# Patient Record
Sex: MALE | Race: WHITE | NOT HISPANIC OR LATINO | Employment: OTHER | URBAN - METROPOLITAN AREA
[De-identification: names, ages, dates, MRNs, and addresses within clinical notes are randomized per-mention and may not be internally consistent; named-entity substitution may affect disease eponyms.]

---

## 2018-01-02 ENCOUNTER — APPOINTMENT (OUTPATIENT)
Dept: RADIOLOGY | Facility: MEDICAL CENTER | Age: 48
End: 2018-01-02
Attending: EMERGENCY MEDICINE
Payer: MEDICARE

## 2018-01-02 ENCOUNTER — HOSPITAL ENCOUNTER (EMERGENCY)
Facility: MEDICAL CENTER | Age: 48
End: 2018-01-02
Attending: EMERGENCY MEDICINE
Payer: MEDICARE

## 2018-01-02 VITALS
HEART RATE: 75 BPM | OXYGEN SATURATION: 95 % | WEIGHT: 194 LBS | DIASTOLIC BLOOD PRESSURE: 71 MMHG | SYSTOLIC BLOOD PRESSURE: 123 MMHG | TEMPERATURE: 97.9 F | RESPIRATION RATE: 16 BRPM | BODY MASS INDEX: 27.16 KG/M2 | HEIGHT: 71 IN

## 2018-01-02 DIAGNOSIS — J06.9 UPPER RESPIRATORY TRACT INFECTION, UNSPECIFIED TYPE: ICD-10-CM

## 2018-01-02 DIAGNOSIS — R05.9 COUGH: ICD-10-CM

## 2018-01-02 PROCEDURE — 71046 X-RAY EXAM CHEST 2 VIEWS: CPT

## 2018-01-02 PROCEDURE — 99284 EMERGENCY DEPT VISIT MOD MDM: CPT | Mod: 25

## 2018-01-02 RX ORDER — BENZONATATE 100 MG/1
100 CAPSULE ORAL 3 TIMES DAILY PRN
Qty: 20 CAP | Refills: 0 | Status: SHIPPED | OUTPATIENT
Start: 2018-01-02

## 2018-01-02 ASSESSMENT — PAIN SCALES - WONG BAKER: WONGBAKER_NUMERICALRESPONSE: DOESN'T HURT AT ALL

## 2018-01-02 NOTE — ED NOTES
Pt c/o cough x 3 weeks, states was dx'd w/ bronchitis and given scripts for, was feeling better then became sicker new year's kerry.

## 2018-01-02 NOTE — ED PROVIDER NOTES
"ED Provider Note    Scribed for Murtaza Ochoa M.D. by Ema Lafleur. 1/2/2018, 8:50 AM.    Primary care provider: No primary care provider on file.  Means of arrival: Walk-in  History obtained from: Patient  History limited by: None    CHIEF COMPLAINT  Chief Complaint   Patient presents with   • Cough     HPI  Sanjiv Cali is a 47 y.o. male who presents to the Emergency Department for cough onset three weeks ago. Patient was evaluated at [facility] four days ago and was given Azithromycin at that time. The patient reportedly finished his antibiotics but \"lost a few\" of the doses. Other symptoms include fever, chills, diarrhea and generalized body aches onset three days ago. Patient denies history of asthma. He also denies being a cigarette smoker.     Patient states he has an appointment with an ENT in [place] in two days to get \"a camera and listening device\" removed form his ear.     REVIEW OF SYSTEMS  Pertinent positives include cough, fever, chills, diarrhea, generalized body aches.   Pertinent negatives include no vomiting.    All other systems reviewed and negative.  C.     PAST MEDICAL HISTORY   No chronic diagnoses reported during this encounter.     SURGICAL HISTORY  patient denies any surgical history    SOCIAL HISTORY  Patient is from [place].     FAMILY HISTORY  None reported during this encounter.     CURRENT MEDICATIONS  No daily medications reported.     ALLERGIES  No Known Allergies    PHYSICAL EXAM  VITAL SIGNS: /73   Pulse 78   Temp 36.6 °C (97.9 °F) (Temporal)   Resp 14   Ht 1.803 m (5' 11\")   Wt 88 kg (194 lb)   SpO2 95%   BMI 27.06 kg/m²     Nursing note and vitals reviewed.  Constitutional: No distress.   HENT: Head is atraumatic. Oropharynx is moist.   Eyes: Conjunctivae are normal. Pupils are equal, round, and reactive to light.   Cardiovascular: Normal peripheral perfusion,Regular rate and rhythm, no audible murmur  Respiratory: No respiratory distress. No rales, " or wheezing. Normal work of breathing.  Musculoskeletal: Normal range of motion.   Neurological: Alert. No focal deficits noted.    Skin: No rash.   Psych: Appropriate for clinical situation     DIAGNOSTIC STUDIES / PROCEDURES    LABS  None.     RADIOLOGY  DX-CHEST-2 VIEWS   Final Result      No consolidation.        The radiologist's interpretation of all radiological studies have been reviewed by me.    COURSE & MEDICAL DECISION MAKING  Nursing notes, VS, PMSFHx reviewed in chart.    Review of past medical records shows the patient has no past visits.      8:50 AM - Patient seen and examined at bedside. I informed the patient I would like to evaluate him for the flu secondary to his symptomology. I also would like to order a chest x-ray to rule out pneumonia. The patient was agreeable. Ordered chest x-ray to evaluate his symptoms. Differential diagnoses include but not limited to: Pneumonia, influenza    10:35 AM Reviewed patient's radiology results, which are shown above.     10:38 AM Patient updated on radiology results.     DISPOSITION:  Patient will be discharged home in stable condition.    FOLLOW UP:  [facility], Emergency Dept  [address] [place] [id number] [phone]    If symptoms worsen      OUTPATIENT MEDICATIONS:  Discharge Medication List as of 1/2/2018 10:12 AM      START taking these medications    Details   benzonatate (TESSALON) 100 MG Cap Take 1 Cap by mouth 3 times a day as needed for Cough., Disp-20 Cap, R-0, Print Rx Paper             The patient was discharged home with an information sheet on upper respiratory tract infection and told to return immediately for any signs or symptoms listed.  The patient agreed to the discharge precautions and follow-up plan which is documented in EPIC.    FINAL IMPRESSION  1. Cough    2. Upper respiratory tract infection, unspecified type          I, Ema Lafleur (Scribe), am scribing for, and in the presence of, Murtaza AMADOR  MAXINE Ochoa.    Electronically signed by: Ema Lafleur (Scribe), 1/2/2018    IMurtaza M.D. personally performed the services described in this documentation, as scribed by Ema Lafleur in my presence, and it is both accurate and complete.    The note accurately reflects work and decisions made by me.  Murtaza Ochoa  1/2/2018  3:22 PM

## 2018-01-02 NOTE — DISCHARGE INSTRUCTIONS
Cough, Adult   A cough is a reflex that helps clear your throat and airways. It can help heal the body or may be a reaction to an irritated airway. A cough may only last 2 or 3 weeks (acute) or may last more than 8 weeks (chronic).   CAUSES  Acute cough:  · Viral or bacterial infections.  Chronic cough:  · Infections.  · Allergies.  · Asthma.  · Post-nasal drip.  · Smoking.  · Heartburn or acid reflux.  · Some medicines.  · Chronic lung problems (COPD).  · Cancer.  SYMPTOMS   · Cough.  · Fever.  · Chest pain.  · Increased breathing rate.  · High-pitched whistling sound when breathing (wheezing).  · Colored mucus that you cough up (sputum).  TREATMENT   · A bacterial cough may be treated with antibiotic medicine.  · A viral cough must run its course and will not respond to antibiotics.  · Your caregiver may recommend other treatments if you have a chronic cough.  HOME CARE INSTRUCTIONS   · Only take over-the-counter or prescription medicines for pain, discomfort, or fever as directed by your caregiver. Use cough suppressants only as directed by your caregiver.  · Use a cold steam vaporizer or humidifier in your bedroom or home to help loosen secretions.  · Sleep in a semi-upright position if your cough is worse at night.  · Rest as needed.  · Stop smoking if you smoke.  SEEK IMMEDIATE MEDICAL CARE IF:   · You have pus in your sputum.  · Your cough starts to worsen.  · You cannot control your cough with suppressants and are losing sleep.  · You begin coughing up blood.  · You have difficulty breathing.  · You develop pain which is getting worse or is uncontrolled with medicine.  · You have a fever.  MAKE SURE YOU:   · Understand these instructions.  · Will watch your condition.  · Will get help right away if you are not doing well or get worse.     This information is not intended to replace advice given to you by your health care provider. Make sure you discuss any questions you have with your health care provider.      Document Released: 06/15/2012 Document Revised: 03/11/2013 Document Reviewed: 02/24/2016  [employer] Interactive Patient Education ©2016 [employer]

## 2018-03-07 ENCOUNTER — HOSPITAL ENCOUNTER (EMERGENCY)
Facility: MEDICAL CENTER | Age: 48
End: 2018-03-07
Attending: EMERGENCY MEDICINE
Payer: MEDICARE

## 2018-03-07 VITALS
RESPIRATION RATE: 16 BRPM | HEIGHT: 71 IN | OXYGEN SATURATION: 99 % | TEMPERATURE: 98.6 F | HEART RATE: 80 BPM | BODY MASS INDEX: 26.17 KG/M2 | DIASTOLIC BLOOD PRESSURE: 60 MMHG | SYSTOLIC BLOOD PRESSURE: 110 MMHG | WEIGHT: 186.95 LBS

## 2018-03-07 DIAGNOSIS — T78.40XA ALLERGIC REACTION, INITIAL ENCOUNTER: ICD-10-CM

## 2018-03-07 PROCEDURE — 99283 EMERGENCY DEPT VISIT LOW MDM: CPT

## 2018-03-07 RX ORDER — HYDROCORTISONE 25 MG/ML
LOTION TOPICAL
Qty: 1 BOTTLE | Refills: 0 | Status: SHIPPED | OUTPATIENT
Start: 2018-03-07

## 2018-03-07 RX ORDER — PREDNISONE 20 MG/1
40 TABLET ORAL DAILY
Qty: 12 TAB | Refills: 0 | Status: SHIPPED | OUTPATIENT
Start: 2018-03-07 | End: 2018-03-13

## 2018-03-07 NOTE — ED PROVIDER NOTES
"ED Provider Note    CHIEF COMPLAINT  Chief Complaint   Patient presents with   • Rash     to upper body, started last night, pt is not on any medications, only allergy is bee stings, took 50mg benadryl this morning which helped       HPI  Sanjiv Cali is a 47 y.o. male who presents for evaluation of rash.  The patient presents with a one-day history of rash to his entire body including his extremities and torso.  He describes being a pruritic rash.  He denies any new soaps lotions creams medications or food exposures.  There's been no recent: Fever, URI symptoms, cardiorespiratory symptoms, gastrointestinal symptoms.  No other complaints.    REVIEW OF SYSTEMS  See HPI for further details.  No health problems such as: Hypertension, diabetes, current pulmonary disorders, gastrointestinal disorder;    PAST MEDICAL HISTORY  History reviewed. No pertinent past medical history.    FAMILY HISTORY  History reviewed. No pertinent family history.    SOCIAL HISTORY  Positive tobacco use; previous alcohol use;    SURGICAL HISTORY  History reviewed. No pertinent surgical history.    CURRENT MEDICATIONS  Home Medications     Reviewed by Mitali Scott R.N. (Registered Nurse) on 03/07/18 at 1017  Med List Status: Complete   Medication Last Dose Status   benzonatate (TESSALON) 100 MG Cap  Active                ALLERGIES  No Known Allergies    PHYSICAL EXAM  VITAL SIGNS: /69   Pulse 88   Temp 37 °C (98.6 °F) (Temporal)   Resp 16   Ht 1.803 m (5' 11\")   Wt 84.8 kg (186 lb 15.2 oz)   SpO2 99%   BMI 26.07 kg/m²    Constitutional: A 47-year-old male, Well developed, Well nourished, No acute distress, Non-toxic appearance.   HENT: Normocephalic, Atraumatic, Nares:Clear, Oropharynx: moist, well hydrated, posterior pharynx:clear   Eyes: PERRL, EOMI, Conjunctiva normal, No discharge.   Neck: Normal range of motion, No tenderness, Supple, No stridor.   Lymphatic: No lymphadenopathy noted.   Cardiovascular: Regular rate and " rhythm without mumurs, gallups, rubs   Thorax & Lungs: Normal Equal breath sounds, No respiratory distress, No wheezing, no stridor, no rales. No chest tenderness.   Abdomen: Soft, nontender, nondistended, no organomegaly, positive bowel sounds normal in quality. No guarding or rebound.  Patient has diffuse erythematous slightly raised Rash over the extremities and torso; no involvement in the palms or soles of the feet;  Skin: Good skin turgor, pink, warm, dry. No petechiae, purpura. Normal capillary refill.   Extremities: Intact distal pulses, No edema, No tenderness, No cyanosis,  Vascular: Pulses are 2+, symmetric in the upper and lower extremities.  Musculoskeletal: Good range of motion in all major joints. No tenderness to palpation or major deformities noted.   Neurologic: Alert & oriented x 3,  No gross focal deficits noted.   Psychiatric: Affect normal, Judgment normal, Mood normal.      COURSE & MEDICAL DECISION MAKING  Pertinent Labs & Imaging studies reviewed. (See chart for details)  Discussion: At this time, the patient presents with a rash that appears to be allergic in etiology.  The exact precipitating cause is unclear, which is quite common.  There is no evidence any intraoral involvement or respiratory compromise.  Vital signs are normal.  Patient looks well clinically.  I see no evidence of findings in regards emergent intervention or workup.  I discussed the findings and treatment plan with patient.  He indicates he is comfortable with this explanation and disposition.    FINAL IMPRESSION  1. Allergic reaction, initial encounter        PLAN  1.  Appropriate discharge instructions given  2.  Hytone lotion 2.5%  3.  Prednisone 20 mg  4.  Follow-up with primary care    Electronically signed by: Guy G Gansert, 3/7/2018 10:48 AM

## 2018-03-07 NOTE — ED TRIAGE NOTES
Ambulates to triage  Chief Complaint   Patient presents with   • Rash     to upper body, started last night, pt is not on any medications, only allergy is bee stings, took 50mg benadryl this morning which helped     VSS.

## 2018-04-24 ENCOUNTER — OFFICE VISIT (OUTPATIENT)
Dept: URGENT CARE | Facility: PHYSICIAN GROUP | Age: 48
End: 2018-04-24
Payer: MEDICARE

## 2018-04-24 VITALS
BODY MASS INDEX: 26.18 KG/M2 | HEIGHT: 71 IN | DIASTOLIC BLOOD PRESSURE: 78 MMHG | HEART RATE: 62 BPM | WEIGHT: 187 LBS | OXYGEN SATURATION: 97 % | TEMPERATURE: 97.3 F | SYSTOLIC BLOOD PRESSURE: 122 MMHG

## 2018-04-24 DIAGNOSIS — R51.9 NONINTRACTABLE HEADACHE, UNSPECIFIED CHRONICITY PATTERN, UNSPECIFIED HEADACHE TYPE: ICD-10-CM

## 2018-04-24 PROCEDURE — 99203 OFFICE O/P NEW LOW 30 MIN: CPT | Performed by: PHYSICIAN ASSISTANT

## 2018-04-24 RX ORDER — ASPIRIN 81 MG/1
81 TABLET, CHEWABLE ORAL
COMMUNITY
Start: 2018-01-24

## 2018-04-24 RX ORDER — NITROGLYCERIN 0.4 MG/1
0.4 TABLET SUBLINGUAL
COMMUNITY
Start: 2018-01-24

## 2018-04-27 ASSESSMENT — ENCOUNTER SYMPTOMS
SORE THROAT: 0
SENSORY CHANGE: 0
VOMITING: 0
WHEEZING: 0
DIZZINESS: 0
FEVER: 0
NECK PAIN: 0
MYALGIAS: 0
EYE DISCHARGE: 0
PHOTOPHOBIA: 1
MIGRAINE HEADACHES: 1
EYE WATERING: 0
DOUBLE VISION: 0
COUGH: 0
EYE REDNESS: 0
WEAKNESS: 0
VISUAL CHANGE: 0
SEIZURES: 0
ABDOMINAL PAIN: 0
TINGLING: 0
SPEECH CHANGE: 0
EYE PAIN: 0
SCALP TENDERNESS: 0
LOSS OF CONSCIOUSNESS: 0
CHILLS: 0
ABNORMAL BEHAVIOR: 0
NAUSEA: 1
HEADACHES: 1
FOCAL WEAKNESS: 0
BLURRED VISION: 1

## 2018-04-27 NOTE — PROGRESS NOTES
Subjective:      Sanjiv Cali is a 47 y.o. male who presents with Headache (x2wks)            Patient is a 47-year-old male who presents today with intermittent headache for the last 2 weeks. Patient reports that the headache was worse this afternoon however since has resolved. Patient is taking aspirin with good relief. Patient reports on history of intermittent migraines for the last several years. Patient was followed by a neurologist when he was living in [place] and is asking for referral at this time. Patient denies being on prophylactic or rescue migraine medication in the past few years, his headaches have been pretty mild and are rare. When he gets the migraine he reports photophobia with slight aura of blurry vision. He denies such at this time.      Headache    This is a recurrent problem. Episode onset: 2 weeks ago. The problem occurs intermittently. The problem has been resolved. The pain is located in the frontal region. The pain quality is similar to prior headaches. The quality of the pain is described as aching. The pain is at a severity of 0/10 (Headache has resolved at this time. ). Associated symptoms include blurred vision, nausea and photophobia. Pertinent negatives include no abdominal pain, abnormal behavior, coughing, dizziness, ear pain, eye pain, eye redness, eye watering, fever, hearing loss, muscle aches, neck pain, phonophobia, scalp tenderness, seizures, sore throat, tingling, visual change, vomiting or weakness. The symptoms are aggravated by bright light. He has tried cold packs (aspirin) for the symptoms. The treatment provided significant relief. His past medical history is significant for migraine headaches.       Review of Systems   Constitutional: Negative for chills, fever and malaise/fatigue.   HENT: Negative for congestion, ear discharge, ear pain, hearing loss and sore throat.    Eyes: Positive for blurred vision and photophobia. Negative for double vision, pain,  "discharge and redness.   Respiratory: Negative for cough and wheezing.    Gastrointestinal: Positive for nausea. Negative for abdominal pain and vomiting.   Genitourinary: Negative for dysuria and urgency.   Musculoskeletal: Negative for myalgias and neck pain.   Skin: Negative for itching and rash.   Neurological: Positive for headaches. Negative for dizziness, tingling, sensory change, speech change, focal weakness, seizures, loss of consciousness and weakness.   All other systems reviewed and are negative.         Objective:     /78   Pulse 62   Temp 36.3 °C (97.3 °F)   Ht 1.803 m (5' 11\")   Wt 84.8 kg (187 lb)   SpO2 97%   BMI 26.08 kg/m²    PMH:  has no past medical history on file.  MEDS:   Current Outpatient Prescriptions:   •  aspirin (ASA) 81 MG Chew Tab chewable tablet, Take 81 mg by mouth., Disp: , Rfl:   •  nitroglycerin (NITROSTAT) 0.4 MG SL Tab, Place 0.4 mg under tongue., Disp: , Rfl:   •  hydrocortisone 2.5 % lotion, Apply to affected area twice a day, Disp: 1 Bottle, Rfl: 0  •  benzonatate (TESSALON) 100 MG Cap, Take 1 Cap by mouth 3 times a day as needed for Cough., Disp: 20 Cap, Rfl: 0  ALLERGIES:   Allergies   Allergen Reactions   • Bee Anaphylaxis     SURGHX: No past surgical history on file.  SOCHX:  reports that he has been smoking Cigars.  His smokeless tobacco use includes Chew. He reports that he does not drink alcohol or use drugs.  FH: Family history was reviewed, no pertinent findings to report    Physical Exam   Constitutional: He is oriented to person, place, and time. He appears well-developed and well-nourished.   HENT:   Head: Normocephalic and atraumatic.   Right Ear: External ear normal.   Left Ear: External ear normal.   Nose: Nose normal.   Mouth/Throat: Oropharynx is clear and moist. No oropharyngeal exudate.   Eyes: EOM are normal. Pupils are equal, round, and reactive to light.   Neck: Normal range of motion. Neck supple.   Cardiovascular: Normal rate and regular " rhythm.    No murmur heard.  Pulmonary/Chest: Effort normal and breath sounds normal. No respiratory distress.   Musculoskeletal: Normal range of motion. He exhibits no edema.   Lymphadenopathy:     He has no cervical adenopathy.   Neurological: He is alert and oriented to person, place, and time. He displays normal reflexes. No cranial nerve deficit. He exhibits normal muscle tone. Coordination normal.   Neg. Finger to nose, neg. Pronator drift, neg. Rhomberg. TANI's appropriate. Gait steady.    Skin: Skin is warm. No rash noted.   Psychiatric: He has a normal mood and affect. His behavior is normal.   Vitals reviewed.              Assessment/Plan:     1. Nonintractable headache, unspecified chronicity pattern, unspecified headache type    Referral to neurology was made today. Patient declines any medication today as patient is having good relief with aspirin and ice pack. I strongly encouraged the patient to make establish care appointment with a new primary care.  Patient given precautionary s/sx that mandate immediate follow up and evaluation in the ED. Advised of risks of not doing so.    DDX, Supportive care, and indications for immediate follow-up discussed with patient.    Instructed to return to clinic or nearest emergency department if we are not available for any change in condition, further concerns, or worsening of symptoms.    The patient demonstrated a good understanding and agreed with the treatment plan.

## 2018-05-07 ENCOUNTER — HOSPITAL ENCOUNTER (EMERGENCY)
Dept: HOSPITAL 8 - ED | Age: 48
Discharge: LEFT BEFORE DISCHARGE | End: 2018-05-07
Payer: SELF-PAY

## 2018-05-07 VITALS — BODY MASS INDEX: 25.86 KG/M2 | HEIGHT: 71 IN | WEIGHT: 184.75 LBS

## 2018-05-07 VITALS — SYSTOLIC BLOOD PRESSURE: 151 MMHG | DIASTOLIC BLOOD PRESSURE: 85 MMHG

## 2018-05-07 DIAGNOSIS — R51: ICD-10-CM

## 2018-05-07 DIAGNOSIS — R05: Primary | ICD-10-CM

## 2018-05-07 DIAGNOSIS — Z53.21: ICD-10-CM

## 2019-06-08 NOTE — ED NOTES
DC instructions and 1 written prescription given to pt; verbalized understanding. DC'd amb.   08-Jun-2019 23:48

## 2020-07-19 ENCOUNTER — APPOINTMENT (OUTPATIENT)
Dept: RADIOLOGY | Facility: MEDICAL CENTER | Age: 50
DRG: 551 | End: 2020-07-19
Payer: OTHER MISCELLANEOUS

## 2020-07-19 ENCOUNTER — APPOINTMENT (OUTPATIENT)
Dept: RADIOLOGY | Facility: MEDICAL CENTER | Age: 50
DRG: 551 | End: 2020-07-19
Attending: EMERGENCY MEDICINE
Payer: OTHER MISCELLANEOUS

## 2020-07-19 ENCOUNTER — HOSPITAL ENCOUNTER (INPATIENT)
Facility: MEDICAL CENTER | Age: 50
LOS: 6 days | DRG: 551 | End: 2020-07-25
Attending: EMERGENCY MEDICINE | Admitting: SURGERY
Payer: OTHER MISCELLANEOUS

## 2020-07-19 DIAGNOSIS — S06.360A TRAUMATIC HEMORRHAGE OF CEREBRUM WITHOUT LOSS OF CONSCIOUSNESS, UNSPECIFIED LATERALITY, INITIAL ENCOUNTER (HCC): ICD-10-CM

## 2020-07-19 DIAGNOSIS — S32.030A CLOSED COMPRESSION FRACTURE OF L3 LUMBAR VERTEBRA, INITIAL ENCOUNTER (HCC): ICD-10-CM
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
## 2020-07-19 DIAGNOSIS — S32.039A CLOSED FRACTURE OF THIRD LUMBAR VERTEBRA, UNSPECIFIED FRACTURE MORPHOLOGY, INITIAL ENCOUNTER (HCC): ICD-10-CM

## 2020-07-19 PROBLEM — S06.331A: Status: ACTIVE | Noted: 2020-07-19

## 2020-07-19 PROBLEM — Z53.09 CONTRAINDICATION TO ANTICOAGULATION THERAPY: Status: ACTIVE | Noted: 2020-07-19

## 2020-07-19 PROBLEM — T14.90XA TRAUMA: Status: ACTIVE | Noted: 2020-07-19

## 2020-07-19 PROBLEM — F10.920 ACUTE ALCOHOL INTOXICATION, UNCOMPLICATED (HCC): Status: ACTIVE | Noted: 2020-07-19

## 2020-07-19 PROBLEM — Z11.9 SCREENING EXAMINATION FOR INFECTIOUS DISEASE: Status: ACTIVE | Noted: 2020-07-19

## 2020-07-19 PROBLEM — S32.020A COMPRESSION FRACTURE OF L2 LUMBAR VERTEBRA, CLOSED, INITIAL ENCOUNTER (HCC): Status: ACTIVE | Noted: 2020-07-19

## 2020-07-19 LAB
ABO GROUP BLD: NORMAL
ALBUMIN SERPL BCP-MCNC: 4.2 G/DL (ref 3.2–4.9)
ALBUMIN/GLOB SERPL: 1.6 G/DL
ALP SERPL-CCNC: 49 U/L (ref 30–99)
ALT SERPL-CCNC: 22 U/L (ref 2–50)
ANION GAP SERPL CALC-SCNC: 18 MMOL/L (ref 7–16)
APTT PPP: 21.8 SEC (ref 24.7–36)
AST SERPL-CCNC: 51 U/L (ref 12–45)
BASOPHILS # BLD AUTO: 0.3 % (ref 0–1.8)
BASOPHILS # BLD: 0.05 K/UL (ref 0–0.12)
BILIRUB SERPL-MCNC: 0.3 MG/DL (ref 0.1–1.5)
BLD GP AB SCN SERPL QL: NORMAL
BUN SERPL-MCNC: 11 MG/DL (ref 8–22)
CALCIUM SERPL-MCNC: 9 MG/DL (ref 8.5–10.5)
CFT BLD TEG: 4 MIN (ref 5–10)
CHLORIDE SERPL-SCNC: 105 MMOL/L (ref 96–112)
CLOT ANGLE BLD TEG: 52 DEGREES (ref 53–72)
CLOT LYSIS 30M P MA LENFR BLD TEG: 0 % (ref 0–8)
CO2 SERPL-SCNC: 22 MMOL/L (ref 20–33)
COVID ORDER STATUS COVID19: NORMAL
CREAT SERPL-MCNC: 0.78 MG/DL (ref 0.5–1.4)
CT.EXTRINSIC BLD ROTEM: 3.3 MIN (ref 1–3)
EOSINOPHIL # BLD AUTO: 0.02 K/UL (ref 0–0.51)
EOSINOPHIL NFR BLD: 0.1 % (ref 0–6.9)
ERYTHROCYTE [DISTWIDTH] IN BLOOD BY AUTOMATED COUNT: 46.7 FL (ref 35.9–50)
ETHANOL BLD-MCNC: 125.2 MG/DL (ref 0–10.1)
GLOBULIN SER CALC-MCNC: 2.6 G/DL (ref 1.9–3.5)
GLUCOSE SERPL-MCNC: 113 MG/DL (ref 65–99)
HCT VFR BLD AUTO: 51.1 % (ref 42–52)
HGB BLD-MCNC: 17.5 G/DL (ref 14–18)
IMM GRANULOCYTES # BLD AUTO: 0.09 K/UL (ref 0–0.11)
IMM GRANULOCYTES NFR BLD AUTO: 0.6 % (ref 0–0.9)
INR PPP: 0.91 (ref 0.87–1.13)
LYMPHOCYTES # BLD AUTO: 1.33 K/UL (ref 1–4.8)
LYMPHOCYTES NFR BLD: 9.2 % (ref 22–41)
MCF BLD TEG: 54.1 MM (ref 50–70)
MCH RBC QN AUTO: 31.8 PG (ref 27–33)
MCHC RBC AUTO-ENTMCNC: 34.2 G/DL (ref 33.7–35.3)
MCV RBC AUTO: 92.7 FL (ref 81.4–97.8)
MONOCYTES # BLD AUTO: 0.9 K/UL (ref 0–0.85)
MONOCYTES NFR BLD AUTO: 6.2 % (ref 0–13.4)
NEUTROPHILS # BLD AUTO: 12.03 K/UL (ref 1.82–7.42)
NEUTROPHILS NFR BLD: 83.6 % (ref 44–72)
NRBC # BLD AUTO: 0 K/UL
NRBC BLD-RTO: 0 /100 WBC
PA AA BLD-ACNC: 48.5 %
PA ADP BLD-ACNC: 79.7 %
PLATELET # BLD AUTO: 161 K/UL (ref 164–446)
PMV BLD AUTO: 10.6 FL (ref 9–12.9)
POTASSIUM SERPL-SCNC: 3.9 MMOL/L (ref 3.6–5.5)
PROT SERPL-MCNC: 6.8 G/DL (ref 6–8.2)
PROTHROMBIN TIME: 12.5 SEC (ref 12–14.6)
RBC # BLD AUTO: 5.51 M/UL (ref 4.7–6.1)
RH BLD: NORMAL
SARS-COV-2 RNA RESP QL NAA+PROBE: NOTDETECTED
SODIUM SERPL-SCNC: 145 MMOL/L (ref 135–145)
SPECIMEN SOURCE: NORMAL
TEG ALGORITHM TGALG: ABNORMAL
WBC # BLD AUTO: 14.4 K/UL (ref 4.8–10.8)

## 2020-07-19 PROCEDURE — 85347 COAGULATION TIME ACTIVATED: CPT

## 2020-07-19 PROCEDURE — U0003 INFECTIOUS AGENT DETECTION BY NUCLEIC ACID (DNA OR RNA); SEVERE ACUTE RESPIRATORY SYNDROME CORONAVIRUS 2 (SARS-COV-2) (CORONAVIRUS DISEASE [COVID-19]), AMPLIFIED PROBE TECHNIQUE, MAKING USE OF HIGH THROUGHPUT TECHNOLOGIES AS DESCRIBED BY CMS-2020-01-R: HCPCS

## 2020-07-19 PROCEDURE — 85576 BLOOD PLATELET AGGREGATION: CPT

## 2020-07-19 PROCEDURE — 80053 COMPREHEN METABOLIC PANEL: CPT

## 2020-07-19 PROCEDURE — 36415 COLL VENOUS BLD VENIPUNCTURE: CPT

## 2020-07-19 PROCEDURE — HZ2ZZZZ DETOXIFICATION SERVICES FOR SUBSTANCE ABUSE TREATMENT: ICD-10-PCS | Performed by: SURGERY

## 2020-07-19 PROCEDURE — 700117 HCHG RX CONTRAST REV CODE 255: Performed by: EMERGENCY MEDICINE

## 2020-07-19 PROCEDURE — 99285 EMERGENCY DEPT VISIT HI MDM: CPT

## 2020-07-19 PROCEDURE — A9270 NON-COVERED ITEM OR SERVICE: HCPCS | Performed by: EMERGENCY MEDICINE

## 2020-07-19 PROCEDURE — 86850 RBC ANTIBODY SCREEN: CPT

## 2020-07-19 PROCEDURE — 700102 HCHG RX REV CODE 250 W/ 637 OVERRIDE(OP): Performed by: EMERGENCY MEDICINE

## 2020-07-19 PROCEDURE — 85384 FIBRINOGEN ACTIVITY: CPT

## 2020-07-19 PROCEDURE — 86901 BLOOD TYPING SEROLOGIC RH(D): CPT

## 2020-07-19 PROCEDURE — 86900 BLOOD TYPING SEROLOGIC ABO: CPT

## 2020-07-19 PROCEDURE — L0464 TLSO 4MOD SACRO-SCAP PRE: HCPCS

## 2020-07-19 PROCEDURE — 71045 X-RAY EXAM CHEST 1 VIEW: CPT

## 2020-07-19 PROCEDURE — 72125 CT NECK SPINE W/O DYE: CPT

## 2020-07-19 PROCEDURE — C9803 HOPD COVID-19 SPEC COLLECT: HCPCS | Performed by: SURGERY

## 2020-07-19 PROCEDURE — 85610 PROTHROMBIN TIME: CPT

## 2020-07-19 PROCEDURE — 305948 HCHG GREEN TRAUMA ACT PRE-NOTIFY NO CC

## 2020-07-19 PROCEDURE — L0458 TLSO 2MOD SYMPHIS-XIPHO PRE: HCPCS

## 2020-07-19 PROCEDURE — 85025 COMPLETE CBC W/AUTO DIFF WBC: CPT

## 2020-07-19 PROCEDURE — 80307 DRUG TEST PRSMV CHEM ANLYZR: CPT

## 2020-07-19 PROCEDURE — 85730 THROMBOPLASTIN TIME PARTIAL: CPT

## 2020-07-19 PROCEDURE — 74177 CT ABD & PELVIS W/CONTRAST: CPT

## 2020-07-19 PROCEDURE — 72131 CT LUMBAR SPINE W/O DYE: CPT

## 2020-07-19 PROCEDURE — 770006 HCHG ROOM/CARE - MED/SURG/GYN SEMI*

## 2020-07-19 PROCEDURE — 70450 CT HEAD/BRAIN W/O DYE: CPT

## 2020-07-19 RX ORDER — AMOXICILLIN 250 MG
1 CAPSULE ORAL
Status: DISCONTINUED | OUTPATIENT
Start: 2020-07-19 | End: 2020-07-25 | Stop reason: HOSPADM

## 2020-07-19 RX ORDER — LORAZEPAM 1 MG/1
1 TABLET ORAL EVERY 4 HOURS PRN
Status: DISCONTINUED | OUTPATIENT
Start: 2020-07-19 | End: 2020-07-21

## 2020-07-19 RX ORDER — LORAZEPAM 2 MG/ML
2 INJECTION INTRAMUSCULAR
Status: DISCONTINUED | OUTPATIENT
Start: 2020-07-19 | End: 2020-07-22

## 2020-07-19 RX ORDER — LEVETIRACETAM 500 MG/1
500 TABLET ORAL 2 TIMES DAILY
Status: DISCONTINUED | OUTPATIENT
Start: 2020-07-20 | End: 2020-07-25 | Stop reason: HOSPADM

## 2020-07-19 RX ORDER — LORAZEPAM 2 MG/ML
1 INJECTION INTRAMUSCULAR
Status: DISCONTINUED | OUTPATIENT
Start: 2020-07-19 | End: 2020-07-21

## 2020-07-19 RX ORDER — LORAZEPAM 2 MG/ML
1.5 INJECTION INTRAMUSCULAR
Status: DISCONTINUED | OUTPATIENT
Start: 2020-07-19 | End: 2020-07-22

## 2020-07-19 RX ORDER — ENEMA 19; 7 G/133ML; G/133ML
1 ENEMA RECTAL
Status: COMPLETED | OUTPATIENT
Start: 2020-07-19 | End: 2020-07-22

## 2020-07-19 RX ORDER — LEVETIRACETAM 500 MG/1
500 TABLET ORAL ONCE
Status: COMPLETED | OUTPATIENT
Start: 2020-07-19 | End: 2020-07-19

## 2020-07-19 RX ORDER — BISACODYL 10 MG
10 SUPPOSITORY, RECTAL RECTAL
Status: DISCONTINUED | OUTPATIENT
Start: 2020-07-19 | End: 2020-07-25 | Stop reason: HOSPADM

## 2020-07-19 RX ORDER — BACITRACIN ZINC AND POLYMYXIN B SULFATE 500; 1000 [USP'U]/G; [USP'U]/G
OINTMENT TOPICAL 3 TIMES DAILY
Status: DISCONTINUED | OUTPATIENT
Start: 2020-07-20 | End: 2020-07-25 | Stop reason: HOSPADM

## 2020-07-19 RX ORDER — LORAZEPAM 1 MG/1
3 TABLET ORAL
Status: DISCONTINUED | OUTPATIENT
Start: 2020-07-19 | End: 2020-07-22

## 2020-07-19 RX ORDER — ONDANSETRON 2 MG/ML
4 INJECTION INTRAMUSCULAR; INTRAVENOUS EVERY 4 HOURS PRN
Status: DISCONTINUED | OUTPATIENT
Start: 2020-07-19 | End: 2020-07-25 | Stop reason: HOSPADM

## 2020-07-19 RX ORDER — POLYETHYLENE GLYCOL 3350 17 G/17G
1 POWDER, FOR SOLUTION ORAL 2 TIMES DAILY
Status: DISCONTINUED | OUTPATIENT
Start: 2020-07-19 | End: 2020-07-25 | Stop reason: HOSPADM

## 2020-07-19 RX ORDER — LORAZEPAM 1 MG/1
4 TABLET ORAL
Status: DISCONTINUED | OUTPATIENT
Start: 2020-07-19 | End: 2020-07-22

## 2020-07-19 RX ORDER — DOCUSATE SODIUM 100 MG/1
100 CAPSULE, LIQUID FILLED ORAL 2 TIMES DAILY
Status: DISCONTINUED | OUTPATIENT
Start: 2020-07-19 | End: 2020-07-25 | Stop reason: HOSPADM

## 2020-07-19 RX ORDER — LORAZEPAM 2 MG/ML
0.5 INJECTION INTRAMUSCULAR EVERY 4 HOURS PRN
Status: DISCONTINUED | OUTPATIENT
Start: 2020-07-19 | End: 2020-07-21

## 2020-07-19 RX ORDER — AMOXICILLIN 250 MG
1 CAPSULE ORAL NIGHTLY
Status: DISCONTINUED | OUTPATIENT
Start: 2020-07-19 | End: 2020-07-25 | Stop reason: HOSPADM

## 2020-07-19 RX ORDER — LORAZEPAM 1 MG/1
2 TABLET ORAL
Status: DISCONTINUED | OUTPATIENT
Start: 2020-07-19 | End: 2020-07-21

## 2020-07-19 RX ORDER — FOLIC ACID 1 MG/1
1 TABLET ORAL DAILY
Status: COMPLETED | OUTPATIENT
Start: 2020-07-20 | End: 2020-07-23

## 2020-07-19 RX ORDER — OXYCODONE HYDROCHLORIDE 5 MG/1
5 TABLET ORAL
Status: DISCONTINUED | OUTPATIENT
Start: 2020-07-19 | End: 2020-07-23

## 2020-07-19 RX ORDER — ACETAMINOPHEN 500 MG
1000 TABLET ORAL EVERY 6 HOURS
Status: DISPENSED | OUTPATIENT
Start: 2020-07-20 | End: 2020-07-25

## 2020-07-19 RX ORDER — ACETAMINOPHEN 500 MG
1000 TABLET ORAL ONCE
Status: COMPLETED | OUTPATIENT
Start: 2020-07-19 | End: 2020-07-19

## 2020-07-19 RX ORDER — THIAMINE MONONITRATE (VIT B1) 100 MG
100 TABLET ORAL DAILY
Status: COMPLETED | OUTPATIENT
Start: 2020-07-20 | End: 2020-07-23

## 2020-07-19 RX ORDER — LORAZEPAM 1 MG/1
0.5 TABLET ORAL EVERY 4 HOURS PRN
Status: DISCONTINUED | OUTPATIENT
Start: 2020-07-19 | End: 2020-07-21

## 2020-07-19 RX ORDER — OXYCODONE HYDROCHLORIDE 10 MG/1
10 TABLET ORAL
Status: DISCONTINUED | OUTPATIENT
Start: 2020-07-19 | End: 2020-07-23

## 2020-07-19 RX ADMIN — ACETAMINOPHEN 1000 MG: 500 TABLET ORAL at 20:48

## 2020-07-19 RX ADMIN — IOHEXOL 100 ML: 350 INJECTION, SOLUTION INTRAVENOUS at 22:26

## 2020-07-19 RX ADMIN — LEVETIRACETAM 500 MG: 500 TABLET ORAL at 21:36

## 2020-07-19 ASSESSMENT — LIFESTYLE VARIABLES
DO YOU DRINK ALCOHOL: YES
EVER_SMOKED: YES

## 2020-07-19 ASSESSMENT — COPD QUESTIONNAIRES
COPD SCREENING SCORE: 3
DURING THE PAST 4 WEEKS HOW MUCH DID YOU FEEL SHORT OF BREATH: NONE/LITTLE OF THE TIME
HAVE YOU SMOKED AT LEAST 100 CIGARETTES IN YOUR ENTIRE LIFE: YES
DO YOU EVER COUGH UP ANY MUCUS OR PHLEGM?: NO/ONLY WITH OCCASIONAL COLDS OR INFECTIONS

## 2020-07-20 ENCOUNTER — APPOINTMENT (OUTPATIENT)
Dept: RADIOLOGY | Facility: MEDICAL CENTER | Age: 50
DRG: 551 | End: 2020-07-20
Attending: NURSE PRACTITIONER
Payer: OTHER MISCELLANEOUS

## 2020-07-20 PROBLEM — S70.311A ABRASION OF RIGHT THIGH: Status: ACTIVE | Noted: 2020-07-20

## 2020-07-20 LAB
ABO + RH BLD: NORMAL
ALBUMIN SERPL BCP-MCNC: 3.9 G/DL (ref 3.2–4.9)
ALBUMIN/GLOB SERPL: 1.7 G/DL
ALP SERPL-CCNC: 44 U/L (ref 30–99)
ALT SERPL-CCNC: 23 U/L (ref 2–50)
ANION GAP SERPL CALC-SCNC: 13 MMOL/L (ref 7–16)
AST SERPL-CCNC: 59 U/L (ref 12–45)
BASOPHILS # BLD AUTO: 0.4 % (ref 0–1.8)
BASOPHILS # BLD: 0.04 K/UL (ref 0–0.12)
BILIRUB SERPL-MCNC: 0.3 MG/DL (ref 0.1–1.5)
BUN SERPL-MCNC: 12 MG/DL (ref 8–22)
CALCIUM SERPL-MCNC: 8.5 MG/DL (ref 8.5–10.5)
CHLORIDE SERPL-SCNC: 102 MMOL/L (ref 96–112)
CO2 SERPL-SCNC: 25 MMOL/L (ref 20–33)
CREAT SERPL-MCNC: 0.8 MG/DL (ref 0.5–1.4)
EOSINOPHIL # BLD AUTO: 0.18 K/UL (ref 0–0.51)
EOSINOPHIL NFR BLD: 1.6 % (ref 0–6.9)
ERYTHROCYTE [DISTWIDTH] IN BLOOD BY AUTOMATED COUNT: 47.3 FL (ref 35.9–50)
GLOBULIN SER CALC-MCNC: 2.3 G/DL (ref 1.9–3.5)
GLUCOSE SERPL-MCNC: 130 MG/DL (ref 65–99)
HCT VFR BLD AUTO: 47.8 % (ref 42–52)
HGB BLD-MCNC: 16.3 G/DL (ref 14–18)
IMM GRANULOCYTES # BLD AUTO: 0.07 K/UL (ref 0–0.11)
IMM GRANULOCYTES NFR BLD AUTO: 0.6 % (ref 0–0.9)
LYMPHOCYTES # BLD AUTO: 1.35 K/UL (ref 1–4.8)
LYMPHOCYTES NFR BLD: 12.2 % (ref 22–41)
MAGNESIUM SERPL-MCNC: 2.1 MG/DL (ref 1.5–2.5)
MCH RBC QN AUTO: 31.7 PG (ref 27–33)
MCHC RBC AUTO-ENTMCNC: 34.1 G/DL (ref 33.7–35.3)
MCV RBC AUTO: 93 FL (ref 81.4–97.8)
MONOCYTES # BLD AUTO: 0.7 K/UL (ref 0–0.85)
MONOCYTES NFR BLD AUTO: 6.3 % (ref 0–13.4)
NEUTROPHILS # BLD AUTO: 8.77 K/UL (ref 1.82–7.42)
NEUTROPHILS NFR BLD: 78.9 % (ref 44–72)
NRBC # BLD AUTO: 0 K/UL
NRBC BLD-RTO: 0 /100 WBC
PHOSPHATE SERPL-MCNC: 4.2 MG/DL (ref 2.5–4.5)
PLATELET # BLD AUTO: 147 K/UL (ref 164–446)
PMV BLD AUTO: 10.3 FL (ref 9–12.9)
POTASSIUM SERPL-SCNC: 3.7 MMOL/L (ref 3.6–5.5)
PROT SERPL-MCNC: 6.2 G/DL (ref 6–8.2)
RBC # BLD AUTO: 5.14 M/UL (ref 4.7–6.1)
SODIUM SERPL-SCNC: 140 MMOL/L (ref 135–145)
WBC # BLD AUTO: 11.1 K/UL (ref 4.8–10.8)

## 2020-07-20 PROCEDURE — 700111 HCHG RX REV CODE 636 W/ 250 OVERRIDE (IP): Performed by: NURSE PRACTITIONER

## 2020-07-20 PROCEDURE — 97161 PT EVAL LOW COMPLEX 20 MIN: CPT

## 2020-07-20 PROCEDURE — A9270 NON-COVERED ITEM OR SERVICE: HCPCS | Performed by: SURGERY

## 2020-07-20 PROCEDURE — 700111 HCHG RX REV CODE 636 W/ 250 OVERRIDE (IP): Performed by: SURGERY

## 2020-07-20 PROCEDURE — 700112 HCHG RX REV CODE 229: Performed by: SURGERY

## 2020-07-20 PROCEDURE — 80053 COMPREHEN METABOLIC PANEL: CPT

## 2020-07-20 PROCEDURE — 36415 COLL VENOUS BLD VENIPUNCTURE: CPT

## 2020-07-20 PROCEDURE — 97165 OT EVAL LOW COMPLEX 30 MIN: CPT

## 2020-07-20 PROCEDURE — 85025 COMPLETE CBC W/AUTO DIFF WBC: CPT

## 2020-07-20 PROCEDURE — 700101 HCHG RX REV CODE 250: Performed by: SURGERY

## 2020-07-20 PROCEDURE — 84100 ASSAY OF PHOSPHORUS: CPT

## 2020-07-20 PROCEDURE — 83735 ASSAY OF MAGNESIUM: CPT

## 2020-07-20 PROCEDURE — 96374 THER/PROPH/DIAG INJ IV PUSH: CPT

## 2020-07-20 PROCEDURE — 700102 HCHG RX REV CODE 250 W/ 637 OVERRIDE(OP): Performed by: SURGERY

## 2020-07-20 PROCEDURE — 770006 HCHG ROOM/CARE - MED/SURG/GYN SEMI*

## 2020-07-20 PROCEDURE — 73110 X-RAY EXAM OF WRIST: CPT | Mod: LT

## 2020-07-20 RX ADMIN — ACETAMINOPHEN 1000 MG: 500 TABLET ORAL at 20:06

## 2020-07-20 RX ADMIN — FENTANYL CITRATE 50 MCG: 50 INJECTION INTRAMUSCULAR; INTRAVENOUS at 03:11

## 2020-07-20 RX ADMIN — FOLIC ACID 1 MG: 1 TABLET ORAL at 06:11

## 2020-07-20 RX ADMIN — Medication 1 EACH: at 12:13

## 2020-07-20 RX ADMIN — ACETAMINOPHEN 1000 MG: 500 TABLET ORAL at 01:31

## 2020-07-20 RX ADMIN — OXYCODONE HYDROCHLORIDE 10 MG: 10 TABLET ORAL at 19:18

## 2020-07-20 RX ADMIN — DOCUSATE SODIUM 50 MG AND SENNOSIDES 8.6 MG 1 TABLET: 8.6; 5 TABLET, FILM COATED ORAL at 20:07

## 2020-07-20 RX ADMIN — FENTANYL CITRATE 50 MCG: 50 INJECTION INTRAMUSCULAR; INTRAVENOUS at 00:11

## 2020-07-20 RX ADMIN — OXYCODONE HYDROCHLORIDE 10 MG: 10 TABLET ORAL at 15:47

## 2020-07-20 RX ADMIN — OXYCODONE HYDROCHLORIDE 10 MG: 10 TABLET ORAL at 23:04

## 2020-07-20 RX ADMIN — THERA TABS 1 TABLET: TAB at 06:11

## 2020-07-20 RX ADMIN — POLYETHYLENE GLYCOL 3350 1 PACKET: 17 POWDER, FOR SOLUTION ORAL at 17:52

## 2020-07-20 RX ADMIN — Medication 1 EACH: at 17:52

## 2020-07-20 RX ADMIN — OXYCODONE HYDROCHLORIDE 10 MG: 10 TABLET ORAL at 12:15

## 2020-07-20 RX ADMIN — Medication 1 EACH: at 06:16

## 2020-07-20 RX ADMIN — OXYCODONE HYDROCHLORIDE 10 MG: 10 TABLET ORAL at 09:15

## 2020-07-20 RX ADMIN — ACETAMINOPHEN 1000 MG: 500 TABLET ORAL at 14:36

## 2020-07-20 RX ADMIN — Medication 100 MG: at 06:11

## 2020-07-20 RX ADMIN — LEVETIRACETAM 500 MG: 500 TABLET ORAL at 17:52

## 2020-07-20 RX ADMIN — OXYCODONE HYDROCHLORIDE 10 MG: 10 TABLET ORAL at 06:11

## 2020-07-20 RX ADMIN — ACETAMINOPHEN 1000 MG: 500 TABLET ORAL at 09:15

## 2020-07-20 RX ADMIN — DOCUSATE SODIUM 100 MG: 100 CAPSULE, LIQUID FILLED ORAL at 17:52

## 2020-07-20 RX ADMIN — LEVETIRACETAM 500 MG: 500 TABLET ORAL at 06:11

## 2020-07-20 RX ADMIN — THIAMINE HYDROCHLORIDE: 100 INJECTION, SOLUTION INTRAMUSCULAR; INTRAVENOUS at 02:17

## 2020-07-20 RX ADMIN — OXYCODONE HYDROCHLORIDE 10 MG: 10 TABLET ORAL at 01:31

## 2020-07-20 ASSESSMENT — LIFESTYLE VARIABLES
AGITATION: *
AGITATION: SOMEWHAT MORE THAN NORMAL ACTIVITY
TOTAL SCORE: 2
HOW MANY TIMES IN THE PAST YEAR HAVE YOU HAD 5 OR MORE DRINKS IN A DAY: 10
PAROXYSMAL SWEATS: NO SWEAT VISIBLE
NAUSEA AND VOMITING: NO NAUSEA AND NO VOMITING
VISUAL DISTURBANCES: NOT PRESENT
TOTAL SCORE: 0
AUDITORY DISTURBANCES: NOT PRESENT
ORIENTATION AND CLOUDING OF SENSORIUM: ORIENTED AND CAN DO SERIAL ADDITIONS
TOTAL SCORE: 4
TREMOR: NO TREMOR
DOES PATIENT WANT TO STOP DRINKING: NO
ANXIETY: MILDLY ANXIOUS
TOTAL SCORE: 4
TREMOR: NO TREMOR
AUDITORY DISTURBANCES: NOT PRESENT
TOTAL SCORE: 4
VISUAL DISTURBANCES: NOT PRESENT
TREMOR: NO TREMOR
AUDITORY DISTURBANCES: NOT PRESENT
AGITATION: SOMEWHAT MORE THAN NORMAL ACTIVITY
VISUAL DISTURBANCES: NOT PRESENT
NAUSEA AND VOMITING: NO NAUSEA AND NO VOMITING
AGITATION: SOMEWHAT MORE THAN NORMAL ACTIVITY
ORIENTATION AND CLOUDING OF SENSORIUM: ORIENTED AND CAN DO SERIAL ADDITIONS
ANXIETY: NO ANXIETY (AT EASE)
AVERAGE NUMBER OF DAYS PER WEEK YOU HAVE A DRINK CONTAINING ALCOHOL: 1
AUDITORY DISTURBANCES: NOT PRESENT
ON A TYPICAL DAY WHEN YOU DRINK ALCOHOL HOW MANY DRINKS DO YOU HAVE: 2
TOTAL SCORE: 0
HAVE YOU EVER FELT YOU SHOULD CUT DOWN ON YOUR DRINKING: NO
AUDITORY DISTURBANCES: NOT PRESENT
HEADACHE, FULLNESS IN HEAD: VERY MILD
NAUSEA AND VOMITING: NO NAUSEA AND NO VOMITING
ANXIETY: MILDLY ANXIOUS
DO YOU DRINK ALCOHOL: YES
AGITATION: SOMEWHAT MORE THAN NORMAL ACTIVITY
ANXIETY: MILDLY ANXIOUS
ORIENTATION AND CLOUDING OF SENSORIUM: ORIENTED AND CAN DO SERIAL ADDITIONS
HEADACHE, FULLNESS IN HEAD: MILD
ANXIETY: MILDLY ANXIOUS
PAROXYSMAL SWEATS: NO SWEAT VISIBLE
ANXIETY: MILDLY ANXIOUS
ORIENTATION AND CLOUDING OF SENSORIUM: ORIENTED AND CAN DO SERIAL ADDITIONS
VISUAL DISTURBANCES: NOT PRESENT
PAROXYSMAL SWEATS: NO SWEAT VISIBLE
HEADACHE, FULLNESS IN HEAD: MILD
TREMOR: NO TREMOR
TOTAL SCORE: 3
HEADACHE, FULLNESS IN HEAD: MILD
PAROXYSMAL SWEATS: NO SWEAT VISIBLE
EVER HAD A DRINK FIRST THING IN THE MORNING TO STEADY YOUR NERVES TO GET RID OF A HANGOVER: NO
TOTAL SCORE: 0
PAROXYSMAL SWEATS: NO SWEAT VISIBLE
AGITATION: NORMAL ACTIVITY
NAUSEA AND VOMITING: NO NAUSEA AND NO VOMITING
HEADACHE, FULLNESS IN HEAD: VERY MILD
ORIENTATION AND CLOUDING OF SENSORIUM: ORIENTED AND CAN DO SERIAL ADDITIONS
SUBSTANCE_ABUSE: 0
TREMOR: NO TREMOR
HEADACHE, FULLNESS IN HEAD: MILD
EVER FELT BAD OR GUILTY ABOUT YOUR DRINKING: NO
TOTAL SCORE: 4
ORIENTATION AND CLOUDING OF SENSORIUM: ORIENTED AND CAN DO SERIAL ADDITIONS
PAROXYSMAL SWEATS: NO SWEAT VISIBLE
CONSUMPTION TOTAL: POSITIVE
AUDITORY DISTURBANCES: NOT PRESENT
VISUAL DISTURBANCES: NOT PRESENT
NAUSEA AND VOMITING: NO NAUSEA AND NO VOMITING
TREMOR: NO TREMOR
VISUAL DISTURBANCES: NOT PRESENT
HAVE PEOPLE ANNOYED YOU BY CRITICIZING YOUR DRINKING: NO
NAUSEA AND VOMITING: NO NAUSEA AND NO VOMITING

## 2020-07-20 ASSESSMENT — COGNITIVE AND FUNCTIONAL STATUS - GENERAL
STANDING UP FROM CHAIR USING ARMS: A LITTLE
SUGGESTED CMS G CODE MODIFIER MOBILITY: CK
DAILY ACTIVITIY SCORE: 22
SUGGESTED CMS G CODE MODIFIER DAILY ACTIVITY: CJ
SUGGESTED CMS G CODE MODIFIER DAILY ACTIVITY: CH
DAILY ACTIVITIY SCORE: 24
MOVING TO AND FROM BED TO CHAIR: A LITTLE
MOVING FROM LYING ON BACK TO SITTING ON SIDE OF FLAT BED: A LOT
MOBILITY SCORE: 18
WALKING IN HOSPITAL ROOM: A LITTLE
HELP NEEDED FOR BATHING: A LITTLE
DRESSING REGULAR LOWER BODY CLOTHING: A LITTLE
SUGGESTED CMS G CODE MODIFIER MOBILITY: CK
WALKING IN HOSPITAL ROOM: A LITTLE
CLIMB 3 TO 5 STEPS WITH RAILING: A LITTLE
STANDING UP FROM CHAIR USING ARMS: A LITTLE
MOVING TO AND FROM BED TO CHAIR: A LOT
MOVING FROM LYING ON BACK TO SITTING ON SIDE OF FLAT BED: A LITTLE
CLIMB 3 TO 5 STEPS WITH RAILING: A LITTLE
TURNING FROM BACK TO SIDE WHILE IN FLAT BAD: A LITTLE
MOBILITY SCORE: 16
TURNING FROM BACK TO SIDE WHILE IN FLAT BAD: A LITTLE

## 2020-07-20 ASSESSMENT — GAIT ASSESSMENTS
DISTANCE (FEET): 135
GAIT LEVEL OF ASSIST: SUPERVISED
DEVIATION: BRADYKINETIC

## 2020-07-20 ASSESSMENT — ENCOUNTER SYMPTOMS
BLURRED VISION: 0
SPEECH CHANGE: 0
NAUSEA: 0
BACK PAIN: 1
SHORTNESS OF BREATH: 0
MYALGIAS: 1
SENSORY CHANGE: 0
FEVER: 0
ABDOMINAL PAIN: 0
ROS GI COMMENTS: PTA

## 2020-07-20 ASSESSMENT — PATIENT HEALTH QUESTIONNAIRE - PHQ9
2. FEELING DOWN, DEPRESSED, IRRITABLE, OR HOPELESS: NOT AT ALL
SUM OF ALL RESPONSES TO PHQ9 QUESTIONS 1 AND 2: 0
1. LITTLE INTEREST OR PLEASURE IN DOING THINGS: NOT AT ALL

## 2020-07-20 ASSESSMENT — ACTIVITIES OF DAILY LIVING (ADL): TOILETING: INDEPENDENT
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
## 2020-07-20 NOTE — PROGRESS NOTES
Consulted with Darian antony about pt having iv fentanyl and oxy for pain. States to only give oral pain medication because this is what pt will have when discharging today. Also notified about pt having L wrist pain. Ordered to order x ray of L wrist.
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
## 2020-07-20 NOTE — ED NOTES
Pt found standing at bedside and stated that he wanted to leave AMA. ERP notified. After ERP explained the risks of leaving. Pt verbalized understanding and able to verbalize risks back to ERP. Pt still wanted to leave AMA. After a few minutes pt stated that he will stay. Pt wanted to leave because he has too much to do tomorrow. Pt transported to floor at this time with all belongings.

## 2020-07-20 NOTE — ASSESSMENT & PLAN NOTE
Admission blood alcohol level of 125.  Trauma alcohol withdrawal protocol initiated.  Alcohol withdrawal surveillance.
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
## 2020-07-20 NOTE — ED NOTES
Med rec updated and complete. Allergies reviewed. Pt is not currently taking any medications.      Home pharmacy [facility].

## 2020-07-20 NOTE — PROGRESS NOTES
2 RN skin check complete upon arrival to unit.   Devices in place: TLSO brace.  Skin assessed under devices: yes.  Confirmed pressure ulcers found on: N/A.  New potential pressure ulcers noted on: N/A. Wound consult placed: No.  The following interventions in place: Patient encouraged to turn every two hours at minimum.     Skin intact overall. Scattered scratches to back and bilateral upper extremities. Abrasion noted to right upper thigh. Abrasion cleansed and left open to air.

## 2020-07-20 NOTE — ED NOTES
Assist RN:  Pt up to restroom with steady gait. Pt returned to room, reporting left wrist pain and left and pain. Pt able to minimally move left index finger.   Pt also reports use of chewing tobacco. Requesting nicotine patch.   Dr. Miller notified of left wrist/hand pain and nicotine patch request.   Pt up to floor with transport.

## 2020-07-20 NOTE — PROGRESS NOTES
"TRAUMA TERTIARY SURVEY     Mental status adequate for full examination?: Yes    Spine cleared (radiologically and/or clinically): Yes    PHYSICAL EXAMINATION:  Vitals: /81   Pulse 87   Temp 37.3 °C (99.1 °F) (Temporal)   Resp 17   Ht 1.778 m (5' 10\")   Wt 79.4 kg (175 lb)   SpO2 93%   BMI 25.11 kg/m²   Constitutional:     General Appearance: appears stated age.  HEENT:     No significant external craniofacial trauma. The pupils are equal, round, and reactive to light bilaterally. The extraocular muscles are intact bilaterally.. The nares and oropharynx are clear. The midface and jaw are stable. No malocclusion is evident.  Neck:    Normal range of motion.  Respiratory:   Inspection: Unlabored respirations, no intercostal retractions, paradoxical motion, or accessory muscle use.   Palpation:  The chest is nontender. The clavicles are non deformed bilaterally..   Auscultation: clear to auscultation.  Cardiovascular:   Auscultation: normal.   Peripheral Pulses: Normal.   Abdomen:   Abdomen is soft, nontender, without organomegaly or masses.  Genitourinary:   (MALE):   Musculoskeletal:   The pelvis is stable.  No significant angulation, deformity, or soft tissue injury involving the upper and lower extremities. Normal range of motion.   Back:   The thoracolumbar spine was examined. Examination is remarkable for tenderness and no significant tenderness, swelling, or deformity in the lumbar region. L3 compression fracture  Skin:   The skin is warm.  Neurologic:    Lali Coma Scale (GCS) 15 E4V5M6. Neurologic examination revealed no focal deficits noted, mental status intact.  Psychiatric:   The patient does not appear depressed or anxious.    IMAGING:  DX-WRIST-COMPLETE 3+ LEFT   Final Result      1.  No evidence of acute fracture or dislocation.      2.  Degenerative change of the radiocarpal joint.         CT-CHEST,ABDOMEN,PELVIS WITH   Final Result         1.  L3 transverse process tip fracture.   2.  L3 " vertebral body fracture, see dedicated CT lumbar spine for further characterization   3.  Diverticulosis   4.  Hepatomegaly      CT-CSPINE WITHOUT PLUS RECONS   Final Result         1.  Multilevel degenerative changes of the cervical spine limit diagnostic sensitivity of this examination, otherwise no acute traumatic bony injury of the cervical spine is apparent.      CT-HEAD W/O   Final Result         1.  Punctate focus of hyperdensity in the right frontal lobe, appearance concerning for small parenchymal hemorrhage.      CT-LSPINE W/O PLUS RECONS   Final Result         1.  Comminuted L3 vertebral body fracture with approximately 1.5 mm retropulsion   2.  Transverse process tip fracture at L3      DX-CHEST-LIMITED (1 VIEW)   Final Result         No acute cardiac or pulmonary abnormality is identified.        All current laboratory studies/radiology exams reviewed: Yes    Completed Consultations:  Dr. MICHELLE Laws - Neurosurgery     Pending Consultations:  No current pending consultation    Newly Identified Diagnoses and Injuries:  Right thigh has a large abrasion on inside of thigh  Local care  Ball of left foot sensitive, follow.     TOTAL RAP SCORE:  RAP Score Total: 6      ETOH Screening  CAGE Score: 0  Assessment complete date: 7/20/2020  Intervention: yes. Patient response to intervention: pt does not drink on a regular basis.   Patient demonstrates understanding of intervention. Patient does not agree to follow-up.   has not been contacted. Follow up with: PCP  Total ETOH intervention time: greater than 30 minutes
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
## 2020-07-20 NOTE — DISCHARGE INSTRUCTIONS
Discharge Instructions    Discharged to home by taxi with self. Discharged via wheelchair, hospital escort: Yes.  Special equipment needed: Walker    Be sure to schedule a follow-up appointment with your primary care doctor or any specialists as instructed.     Discharge Plan:   Diet Plan: Discussed  Activity Level: Discussed  Smoking Cessation Offered: Patient Refused  Confirmed Follow up Appointment: Patient to Call and Schedule Appointment  Confirmed Symptoms Management: Discussed  Medication Reconciliation Updated: Yes    I understand that a diet low in cholesterol, fat, and sodium is recommended for good health. Unless I have been given specific instructions below for another diet, I accept this instruction as my diet prescription.   Other diet: Regular    Special Instructions: None    · Is patient discharged on Warfarin / Coumadin?   No     Depression / Suicide Risk    As you are discharged from this [facility] facility, it is important to learn how to keep safe from harming yourself.    Recognize the warning signs:  · Abrupt changes in personality, positive or negative- including increase in energy   · Giving away possessions  · Change in eating patterns- significant weight changes-  positive or negative  · Change in sleeping patterns- unable to sleep or sleeping all the time   · Unwillingness or inability to communicate  · Depression  · Unusual sadness, discouragement and loneliness  · Talk of wanting to die  · Neglect of personal appearance   · Rebelliousness- reckless behavior  · Withdrawal from people/activities they love  · Confusion- inability to concentrate     If you or a loved one observes any of these behaviors or has concerns about self-harm, here's what you can do:  · Talk about it- your feelings and reasons for harming yourself  · Remove any means that you might use to hurt yourself (examples: pills, rope, extension cords, firearm)  · Get professional help from the community (Mental Health,  Substance Abuse, psychological counseling)  · Do not be alone:Call your Safe Contact- someone whom you trust who will be there for you.  · Call your local CRISIS HOTLINE 377-9819 or 357-434-6979  · Call your local Children's Mobile Crisis Response Team [place] (691) 223-5112 or www.Exter[employer]  · Call the toll free National Suicide Prevention Hotlines   · National Suicide Prevention Lifeline 942-502-LFCZ (8168)  · National Hope Line Network 800-SUICIDE (039-2985)    Oxycodone tablets or capsules  What is this medicine?  OXYCODONE (ox i KOE done) is a pain reliever. It is used to treat moderate to severe pain.  This medicine may be used for other purposes; ask your health care provider or pharmacist if you have questions.  COMMON BRAND NAME(S): Dazidox, Endocodone, Oxaydo, OXECTA, OxyIR, Percolone, Roxicodone, Roxybond  What should I tell my health care provider before I take this medicine?  They need to know if you have any of these conditions:  · [place]'s disease  · brain tumor  · head injury  · heart disease  · history of drug or alcohol abuse problem  · if you often drink alcohol  · kidney disease  · liver disease  · lung or breathing disease, like asthma  · mental illness  · pancreatic disease  · seizures  · thyroid disease  · an unusual or allergic reaction to oxycodone, codeine, hydrocodone, morphine, other medicines, foods, dyes, or preservatives  · pregnant or trying to get pregnant  · breast-feeding  How should I use this medicine?  Take this medicine by mouth with a glass of water. Follow the directions on the prescription label. You can take it with or without food. If it upsets your stomach, take it with food. Take your medicine at regular intervals. Do not take it more often than directed. Do not stop taking except on your doctor's advice.  Some brands of this medicine, like Oxecta, have special instructions. Ask your doctor or pharmacist if these directions are for you: Do not cut, crush or  chew this medicine. Swallow only one tablet at a time. Do not wet, soak, or lick the tablet before you take it.  A special MedGuide will be given to you by the pharmacist with each prescription and refill. Be sure to read this information carefully each time.  Talk to your pediatrician regarding the use of this medicine in children. Special care may be needed.  Overdosage: If you think you have taken too much of this medicine contact a poison control center or emergency room at once.  NOTE: This medicine is only for you. Do not share this medicine with others.  What if I miss a dose?  If you miss a dose, take it as soon as you can. If it is almost time for your next dose, take only that dose. Do not take double or extra doses.  What may interact with this medicine?  This medicine may interact with the following medications:  · alcohol  · antihistamines for allergy, cough and cold  · antiviral medicines for HIV or AIDS  · atropine  · certain antibiotics like clarithromycin, erythromycin, linezolid, rifampin  · certain medicines for anxiety or sleep  · certain medicines for bladder problems like oxybutynin, tolterodine  · certain medicines for depression like amitriptyline, fluoxetine, sertraline  · certain medicines for fungal infections like ketoconazole, itraconazole, voriconazole  · certain medicines for migraine headache like almotriptan, eletriptan, frovatriptan, naratriptan, rizatriptan, sumatriptan, zolmitriptan  · certain medicines for nausea or vomiting like dolasetron, ondansetron, palonosetron  · certain medicines for Parkinson's disease like benztropine, trihexyphenidyl  · certain medicines for seizures like phenobarbital, phenytoin, primidone  · certain medicines for stomach problems like dicyclomine, hyoscyamine  · certain medicines for travel sickness like scopolamine  · diuretics  · general anesthetics like halothane, isoflurane, methoxyflurane, propofol  · ipratropium  · local anesthetics like  lidocaine, pramoxine, tetracaine  · MAOIs like Carbex, Eldepryl, Marplan, Nardil, and Parnate  · medicines that relax muscles for surgery  · methylene blue  · nilotinib  · other narcotic medicines for pain or cough  · phenothiazines like chlorpromazine, mesoridazine, prochlorperazine, thioridazine  This list may not describe all possible interactions. Give your health care provider a list of all the medicines, herbs, non-prescription drugs, or dietary supplements you use. Also tell them if you smoke, drink alcohol, or use illegal drugs. Some items may interact with your medicine.  What should I watch for while using this medicine?  Tell your doctor or health care professional if your pain does not go away, if it gets worse, or if you have new or a different type of pain. You may develop tolerance to the medicine. Tolerance means that you will need a higher dose of the medicine for pain relief. Tolerance is normal and is expected if you take this medicine for a long time.  Do not suddenly stop taking your medicine because you may develop a severe reaction. Your body becomes used to the medicine. This does NOT mean you are addicted. Addiction is a behavior related to getting and using a drug for a non-medical reason. If you have pain, you have a medical reason to take pain medicine. Your doctor will tell you how much medicine to take. If your doctor wants you to stop the medicine, the dose will be slowly lowered over time to avoid any side effects.  There are different types of narcotic medicines (opiates). If you take more than one type at the same time or if you are taking another medicine that also causes drowsiness, you may have more side effects. Give your health care provider a list of all medicines you use. Your doctor will tell you how much medicine to take. Do not take more medicine than directed. Call emergency for help if you have problems breathing or unusual sleepiness.  You may get drowsy or dizzy. Do  not drive, use machinery, or do anything that needs mental alertness until you know how the medicine affects you. Do not stand or sit up quickly, especially if you are an older patient. This reduces the risk of dizzy or fainting spells. Alcohol may interfere with the effect of this medicine. Avoid alcoholic drinks.  This medicine will cause constipation. Try to have a bowel movement at least every 2 to 3 days. If you do not have a bowel movement for 3 days, call your doctor or health care professional.  Your mouth may get dry. Chewing sugarless gum or sucking hard candy, and drinking plenty of water may help. Contact your doctor if the problem does not go away or is severe.  What side effects may I notice from receiving this medicine?  Side effects that you should report to your doctor or health care professional as soon as possible:  · allergic reactions like skin rash, itching or hives, swelling of the face, lips, or tongue  · breathing problems  · confusion  · signs and symptoms of low blood pressure like dizziness; feeling faint or lightheaded, falls; unusually weak or tired  · trouble passing urine or change in the amount of urine  · trouble swallowing  Side effects that usually do not require medical attention (report to your doctor or health care professional if they continue or are bothersome):  · constipation  · dry mouth  · nausea, vomiting  · tiredness  This list may not describe all possible side effects. Call your doctor for medical advice about side effects. You may report side effects to FDA at 5-655-FDA-3992.  Where should I keep my medicine?  Keep out of the reach of children. This medicine can be abused. Keep your medicine in a safe place to protect it from theft. Do not share this medicine with anyone. Selling or giving away this medicine is dangerous and against the law.  Store at room temperature between 15 and 30 degrees C (59 and 86 degrees F). Protect from light. Keep container tightly  closed.  This medicine may cause harm and death if it is taken by other adults, children, or pets. Return medicine that has not been used to an official disposal site. Contact the [facility] at [phone] or your [facility] government to find a site. If you cannot return the medicine, flush it down the toilet. Do not use the medicine after the expiration date.  NOTE: This sheet is a summary. It may not cover all possible information. If you have questions about this medicine, talk to your doctor, pharmacist, or health care provider.  © 2020 Elsevier/[employer] Standard (2018-04-24 16:13:10)  Acetaminophen tablets or caplets  What is this medicine?  ACETAMINOPHEN (a set a AMBER jim fen) is a pain reliever. It is used to treat mild pain and fever.  This medicine may be used for other purposes; ask your health care provider or pharmacist if you have questions.  COMMON BRAND NAME(S): Aceta, Actamin, Anacin Aspirin Free, Genapap, Genebs, Mapap, Pain & Fever, Pain and Fever, PAIN RELIEF, PAIN RELIEF Extra Strength, Pain Reliever, Panadol, PHARBETOL, Q-Pap, Q-Pap Extra Strength, Tylenol, Tylenol CrushableTablet, Tylenol Extra Strength, XS No Aspirin, XS Pain Reliever  What should I tell my health care provider before I take this medicine?  They need to know if you have any of these conditions:  · if you often drink alcohol  · liver disease  · an unusual or allergic reaction to acetaminophen, other medicines, foods, dyes, or preservatives  · pregnant or trying to get pregnant  · breast-feeding  How should I use this medicine?  Take this medicine by mouth with a glass of water. Follow the directions on the package or prescription label. Take your medicine at regular intervals. Do not take your medicine more often than directed.  Talk to your pediatrician regarding the use of this medicine in children. While this drug may be prescribed for children as young as 6 years of age for selected conditions, precautions do apply.  Overdosage:  If you think you have taken too much of this medicine contact a poison control center or emergency room at once.  NOTE: This medicine is only for you. Do not share this medicine with others.  What if I miss a dose?  If you miss a dose, take it as soon as you can. If it is almost time for your next dose, take only that dose. Do not take double or extra doses.  What may interact with this medicine?  · alcohol  · imatinib  · isoniazid  · other medicines with acetaminophen  This list may not describe all possible interactions. Give your health care provider a list of all the medicines, herbs, non-prescription drugs, or dietary supplements you use. Also tell them if you smoke, drink alcohol, or use illegal drugs. Some items may interact with your medicine.  What should I watch for while using this medicine?  Tell your doctor or health care professional if the pain lasts more than 10 days (5 days for children), if it gets worse, or if there is a new or different kind of pain. Also, check with your doctor if a fever lasts for more than 3 days.  Do not take other medicines that contain acetaminophen with this medicine. Always read labels carefully. If you have questions, ask your doctor or pharmacist.  If you take too much acetaminophen get medical help right away. Too much acetaminophen can be very dangerous and cause liver damage. Even if you do not have symptoms, it is important to get help right away.  What side effects may I notice from receiving this medicine?  Side effects that you should report to your doctor or health care professional as soon as possible:  · allergic reactions like skin rash, itching or hives, swelling of the face, lips, or tongue  · breathing problems  · fever or sore throat  · redness, blistering, peeling or loosening of the skin, including inside the mouth  · trouble passing urine or change in the amount of urine  · unusual bleeding or bruising  · unusually weak or tired  · yellowing of the  eyes or skin  Side effects that usually do not require medical attention (report to your doctor or health care professional if they continue or are bothersome):  · headache  · nausea, stomach upset  This list may not describe all possible side effects. Call your doctor for medical advice about side effects. You may report side effects to FDA at 5-504-ZZS-5251.  Where should I keep my medicine?  Keep out of reach of children.  Store at room temperature between 20 and 25 degrees C (68 and 77 degrees F). Protect from moisture and heat. Throw away any unused medicine after the expiration date.  NOTE: This sheet is a summary. It may not cover all possible information. If you have questions about this medicine, talk to your doctor, pharmacist, or health care provider.  © 2020 [employer]/Gold Standard (2014-08-11 12:54:16)  Metaxalone tablets  What is this medicine?  METAXALONE (me TAX a lone) is a muscle relaxer. It is used to treat pain and stiffness in muscles caused by strains, sprains, or other injury.  This medicine may be used for other purposes; ask your health care provider or pharmacist if you have questions.  COMMON BRAND NAME(S): Metaxall, Skelaxin  What should I tell my health care provider before I take this medicine?  They need to know if you have any of these conditions:  · anemia or blood disorder  · kidney disease  · liver disease  · an unusual or allergic reaction to metaxalone, other medicines, foods, dyes, or preservatives  · pregnant or trying to get pregnant  · breast-feeding  How should I use this medicine?  Take this medicine by mouth. Swallow it with a full glass of water. Follow the directions on the prescription label. Do not take more medicine than you are told to take.  Talk to your pediatrician regarding the use of this medicine in children. While this drug may be prescribed for children as young as 13 years of age for selected conditions, precautions do apply.  Overdosage: If you think you  have taken too much of this medicine contact a poison control center or emergency room at once.  NOTE: This medicine is only for you. Do not share this medicine with others.  What if I miss a dose?  If you miss a dose, take it as soon as you can. If it is almost time for your next dose, take only that dose. Do not take double or extra doses.  What may interact with this medicine?  Do not take this medication with any of the following medicines:  · narcotic medicines for cough  This medicine may also interact with the following medications:  · alcohol  · antihistamines for allergy, cough and cold  · certain medicines for anxiety or sleep  · certain medicines for depression like amitriptyline, fluoxetine, sertraline  · certain medicines for seizures like phenobarbital, primidone  · general anesthetics like halothane, isoflurane, methoxyflurane, propofol  · local anesthetics like lidocaine, pramoxine, tetracaine  · medicines that relax muscles for surgery  · narcotic medicines for pain  · phenothiazines like chlorpromazine, mesoridazine, prochlorperazine, thioridazine  This list may not describe all possible interactions. Give your health care provider a list of all the medicines, herbs, non-prescription drugs, or dietary supplements you use. Also tell them if you smoke, drink alcohol, or use illegal drugs. Some items may interact with your medicine.  What should I watch for while using this medicine?  Tell your doctor or health care professional if your symptoms do not start to get better or if they get worse.  You may get drowsy or dizzy. Do not drive, use machinery, or do anything that needs mental alertness until you know how this medicine affects you. Do not stand or sit up quickly, especially if you are an older patient. This reduces the risk of dizzy or fainting spells. Alcohol may interfere with the effect of this medicine. Avoid alcoholic drinks.  If you are taking another medicine that also causes drowsiness,  you may have more side effects. Give your health care provider a list of all medicines you use. Your doctor will tell you how much medicine to take. Do not take more medicine than directed. Call emergency for help if you have problems breathing or unusual sleepiness.  What side effects may I notice from receiving this medicine?  Side effects that you should report to your doctor or health care professional as soon as possible:  · allergic reactions like skin rash, itching or hives, swelling of the face, lips, or tongue  · breathing problems  · unusually weak or tired  Side effects that usually do not require medical attention (report to your doctor or health care professional if they continue or are bothersome):  · anxious  · headache  · irritability  · upset stomach  This list may not describe all possible side effects. Call your doctor for medical advice about side effects. You may report side effects to FDA at 0-955-FDA-2403.  Where should I keep my medicine?  Keep out of the reach of children.  Store at room temperature between 15 and 30 degrees C (59 and 86 degrees F). Keep container tightly closed. Throw away any unused medicine after the expiration date.  NOTE: This sheet is a summary. It may not cover all possible information. If you have questions about this medicine, talk to your doctor, pharmacist, or health care provider.  © 2020 [employer]/Gold Standard (2016-09-27 12:20:58)  Levetiracetam tablets  What is this medicine?  LEVETIRACETAM (nolberto valderrama) is an antiepileptic drug. It is used with other medicines to treat certain types of seizures.  This medicine may be used for other purposes; ask your health care provider or pharmacist if you have questions.  COMMON BRAND NAME(S): Ciro Hilario  What should I tell my health care provider before I take this medicine?  They need to know if you have any of these conditions:  · kidney disease  · suicidal thoughts, plans, or attempt; a previous suicide  attempt by you or a family member  · an unusual or allergic reaction to levetiracetam, other medicines, foods, dyes, or preservatives  · pregnant or trying to get pregnant  · breast-feeding  How should I use this medicine?  Take this medicine by mouth with a glass of water. Follow the directions on the prescription label. Swallow the tablets whole. Do not crush or chew this medicine. You may take this medicine with or without food. Take your doses at regular intervals. Do not take your medicine more often than directed. Do not stop taking this medicine or any of your seizure medicines unless instructed by your doctor or health care professional. Stopping your medicine suddenly can increase your seizures or their severity.  A special MedGuide will be given to you by the pharmacist with each prescription and refill. Be sure to read this information carefully each time.  Contact your pediatrician or health care professional regarding the use of this medication in children. While this drug may be prescribed for children as young as 4 years of age for selected conditions, precautions do apply.  Overdosage: If you think you have taken too much of this medicine contact a poison control center or emergency room at once.  NOTE: This medicine is only for you. Do not share this medicine with others.  What if I miss a dose?  If you miss a dose, take it as soon as you can. If it is almost time for your next dose, take only that dose. Do not take double or extra doses.  What may interact with this medicine?  This medicine may interact with the following medications:  · carbamazepine  · colesevelam  · probenecid  · sevelamer  This list may not describe all possible interactions. Give your health care provider a list of all the medicines, herbs, non-prescription drugs, or dietary supplements you use. Also tell them if you smoke, drink alcohol, or use illegal drugs. Some items may interact with your medicine.  What should I watch  for while using this medicine?  Visit your doctor or health care provider for a regular check on your progress. Wear a medical identification bracelet or chain to say you have epilepsy, and carry a card that lists all your medications.  This medicine may cause serious skin reactions. They can happen weeks to months after starting the medicine. Contact your health care provider right away if you notice fevers or flu-like symptoms with a rash. The rash may be red or purple and then turn into blisters or peeling of the skin. Or, you might notice a red rash with swelling of the face, lips or lymph nodes in your neck or under your arms.  It is important to take this medicine exactly as instructed by your health care provider. When first starting treatment, your dose may need to be adjusted. It may take weeks or months before your dose is stable. You should contact your doctor or health care provider if your seizures get worse or if you have any new types of seizures.  You may get drowsy or dizzy. Do not drive, use machinery, or do anything that needs mental alertness until you know how this medicine affects you. Do not stand or sit up quickly, especially if you are an older patient. This reduces the risk of dizzy or fainting spells. Alcohol may interfere with the effect of this medicine. Avoid alcoholic drinks.  The use of this medicine may increase the chance of suicidal thoughts or actions. Pay special attention to how you are responding while on this medicine. Any worsening of mood, or thoughts of suicide or dying should be reported to your health care provider right away.  Women who become pregnant while using this medicine may enroll in the [place] Antiepileptic Drug Pregnancy Registry by calling 1-494.771.4170. This registry collects information about the safety of antiepileptic drug use during pregnancy.  What side effects may I notice from receiving this medicine?  Side effects that you should report to  your doctor or health care professional as soon as possible:  · allergic reactions like skin rash, itching or hives, swelling of the face, lips, or tongue  · breathing problems  · dark urine  · general ill feeling or flu-like symptoms  · problems with balance, talking, walking  · rash, fever, and swollen lymph nodes  · redness, blistering, peeling or loosening of the skin, including inside the mouth  · unusually weak or tired  · worsening of mood, thoughts or actions of suicide or dying  · yellowing of the eyes or skin  Side effects that usually do not require medical attention (report to your doctor or health care professional if they continue or are bothersome):  · diarrhea  · dizzy, drowsy  · headache  · loss of appetite  This list may not describe all possible side effects. Call your doctor for medical advice about side effects. You may report side effects to FDA at 3-471-FDA-3226.  Where should I keep my medicine?  Keep out of reach of children.  Store at room temperature between 15 and 30 degrees C (59 and 86 degrees F). Throw away any unused medicine after the expiration date.  NOTE: This sheet is a summary. It may not cover all possible information. If you have questions about this medicine, talk to your doctor, pharmacist, or health care provider.  © 2020 Elsevier/Gold Standard (2020-03-20 15:23:36)  Morphine sustained-release capsules  What is this medicine?  MORPHINE (MOR feen) is a pain reliever. It is used to treat moderate to severe pain that lasts for more than a few days.  This medicine may be used for other purposes; ask your health care provider or pharmacist if you have questions.  COMMON BRAND NAME(S): Ana  What should I tell my health care provider before I take this medicine?  They need to know if you have any of these conditions:  · brain tumor  · drug abuse or addiction  · head injury  · heart disease  · if you often drink alcohol  · liver disease  · lung or breathing disease, like  asthma  · problems urinating  · seizures  · stomach or intestine problems  · taken an MAOI like Carbex, Eldepryl, Marplan, Nardil, or Parnate in the last 14 days  · an unusual or allergic reaction to morphine, other medications, foods, dyes, or preservatives  · pregnant or trying to get pregnant  · breast-feeding  How should I use this medicine?  Take this medicine by mouth with a glass of water. Do not break, crush, or chew the capsules. Do not take a capsule that is not whole. A broken or crushed capsule can be very dangerous. You may get too much medicine. If the medicine upsets your stomach, take the medicine with food or milk. If you have problems swallowing the capsule, you may carefully open it and sprinkle the contents on a small amount of cold applesauce. Immediately swallow all of the applesauce. Do not save it for later. Do not chew the applesauce. Do not let the medicine dissolve in the applesauce. Rinse your mouth with a sip of water. None of the medicine should stay in your mouth. Swallow all of the medicine. Follow the directions on the prescription label. Take the medicine at the same time each day. Do not take more medicine than you are told to take.  A special MedGuide will be given to you by the pharmacist with each prescription and refill. Be sure to read this information carefully each time.  Talk to your pediatrician regarding the use of this medicine in children. Special care may be needed.  Overdosage: If you think you have taken too much of this medicine contact a poison control center or emergency room at once.  NOTE: This medicine is only for you. Do not share this medicine with others.  What if I miss a dose?  If you miss a dose, take it as soon as you can. If it is almost time for your next dose, take only that dose. Do not take double or extra doses.  What may interact with this medicine?  This medicine may interact with the following medications:  · alcohol  · antihistamines for  allergy, cough and cold  · atropine  · certain medicines for anxiety or sleep  · certain medicines for bladder problems like oxybutynin, tolterodine  · certain medicines for depression like amitriptyline, fluoxetine, sertraline  · certain medicines for Parkinson's disease like benztropine, trihexyphenidyl  · certain medicines for seizures like phenobarbital, primidone  · certain medicines for stomach problems like dicyclomine, hyoscyamine  · certain medicines for travel sickness like scopolamine  · cimetidine  · general anesthetics like halothane, isoflurane, methoxyflurane, propofol  · ipratropium  · local anesthetics like lidocaine, pramoxine, tetracaine  · MAOIs like Carbex, Eldepryl, Marplan, Nardil, and Parnate  · medicines that relax muscles for surgery  · other narcotic medicines for pain or cough  · phenothiazines like chlorpromazine, mesoridazine, prochlorperazine, thioridazine  This list may not describe all possible interactions. Give your health care provider a list of all the medicines, herbs, non-prescription drugs, or dietary supplements you use. Also tell them if you smoke, drink alcohol, or use illegal drugs. Some items may interact with your medicine.  What should I watch for while using this medicine?  Tell your doctor or health care professional if your pain does not go away, if it gets worse, or if you have new or a different type of pain. You may develop tolerance to the medicine. Tolerance means that you will need a higher dose of the medicine for pain relief. Tolerance is normal and is expected if you take morphine for a long time.  Do not suddenly stop taking your medicine because you may develop a severe reaction. Your body becomes used to the medicine. This does NOT mean you are addicted. Addiction is a behavior related to getting and using a drug for a non-medical reason. If you have pain, you have a medical reason to take pain medicine. Your doctor will tell you how much medicine to  take. If your doctor wants you to stop the medicine, the dose will be slowly lowered over time to avoid any side effects.  There are different types of narcotic medicines (opiates). If you take more than one type at the same time or if you are taking another medicine that also causes drowsiness, you may have more side effects. Give your health care provider a list of all medicines you use. Your doctor will tell you how much medicine to take. Do not take more medicine than directed. Call emergency for help if you have problems breathing or unusual sleepiness.  You may get drowsy or dizzy. Do not drive, use machinery, or do anything that needs mental alertness until you know how this medicine affects you. Do not stand or sit up quickly, especially if you are an older patient. This reduces the risk of dizzy or fainting spells. Alcohol may interfere with the effect of this medicine. Avoid alcoholic drinks.  This medicine will cause constipation. Try to have a bowel movement at least every 2 to 3 days. If you do not have a bowel movement for 3 days, call your doctor or health care professional.  Your mouth may get dry. Chewing sugarless gum or sucking hard candy, and drinking plenty of water may help. Contact your doctor if the problem does not go away or is severe.  What side effects may I notice from receiving this medicine?  Side effects that you should report to your doctor or health care professional as soon as possible:  · allergic reactions like skin rash, itching or hives, swelling of the face, lips, or tongue  · breathing problems  · confusion  · seizures  · signs and symptoms of low blood pressure like dizziness; feeling faint or lightheaded, falls; unusually weak or tired  · trouble passing urine or change in the amount of urine  Side effects that usually do not require medical attention (report to your doctor or health care professional if they continue or are bothersome):  · constipation  · dry  mouth  · nausea, vomiting  · tiredness  This list may not describe all possible side effects. Call your doctor for medical advice about side effects. You may report side effects to FDA at 5-180-NLH-0316.  Where should I keep my medicine?  Keep out of the reach of children. This medicine can be abused. Keep your medicine in a safe place to protect it from theft. Do not share this medicine with anyone. Selling or giving away this medicine is dangerous and is against the law.  Store at room temperature between 15 and 30 degrees C (59 and 86 degrees F). Protect from light.  This medicine may cause harm and death if it is taken by other adults, children, or pets. Return medicine that has not been used to an official disposal site. Contact the [facility] at [phone] or your [facility] government to find a site. If you cannot return the medicine, flush it down the toilet. Do not use the medicine after the expiration date.  NOTE: This sheet is a summary. It may not cover all possible information. If you have questions about this medicine, talk to your doctor, pharmacist, or health care provider.  © 2020 Elsevier/Gold Standard (2018-04-24 15:27:45)  Lidocaine dermal patch  What is this medicine?  LIDOCAINE (LYE quan murillo) causes loss of feeling in the skin and surrounding area. The medicine helps treat pain, including nerve pain.  This medicine may be used for other purposes; ask your health care provider or pharmacist if you have questions.  COMMON BRAND NAME(S): Aspercreme with Lidocaine, GEN7T, Lidocare, Lidoderm, ZTlido  What should I tell my health care provider before I take this medicine?  They need to know if you have any of these conditions:  · heart disease  · history of irregular heart beat  · liver disease  · skin conditions or sensitivity  · skin infection  · an unusual or allergic reaction to lidocaine, parabens, other medicines, foods, dyes, or preservatives  · pregnant or trying to get  pregnant  · breast-feeding  How should I use this medicine?  This medicine is for external use only. Follow the directions on the prescription label or package.  Talk to your pediatrician regarding the use of this medicine in children. Special care may be needed.  Overdosage: If you think you have taken too much of this medicine contact a poison control center or emergency room at once.  NOTE: This medicine is only for you. Do not share this medicine with others.  What if I miss a dose?  If you miss a dose, take it as soon as you can. If it is almost time for your next dose, take only that dose. Do not take double or extra doses.  What may interact with this medicine?  Do not take this medicine with any of the following medications:  · certain medicines for irregular heart beat  · MAOIs like Carbex, Eldepryl, Marplan, Nardil, and Parnate  This medicine may also interact with the following medications:  · other local anesthetics like pramoxine, tetracaine  Do not use any other skin products on the affected area without asking your doctor or health care professional.  This list may not describe all possible interactions. Give your health care provider a list of all the medicines, herbs, non-prescription drugs, or dietary supplements you use. Also tell them if you smoke, drink alcohol, or use illegal drugs. Some items may interact with your medicine.  What should I watch for while using this medicine?  Tell your doctor or healthcare professional if your symptoms do not start to get better or if they get worse.  Be careful to avoid injury while the area is numb from the medicine, and you are not aware of pain.  If you are going to need surgery, a MRI, CT scan, or other procedure, tell your doctor that you are using this medicine. You may need to remove this patch before the procedure.  Do not get this medicine in your eyes. If you do, rinse out with plenty of cool tap water.  This medicine can make certain skin  conditions worse. Only use it for conditions for which your doctor or health care professional has prescribed.  What side effects may I notice from receiving this medicine?  Side effects that you should report to your doctor or health care professional as soon as possible:  · allergic reactions like skin rash, itching or hives, swelling of the face, lips, or tongue  · breathing problems  · chest pain or chest tightness  · dizzines  Side effects that usually do not require medical attention (report to your doctor or health care professional if they continue or are bothersome):  · tingling, numbness at site where applied  This list may not describe all possible side effects. Call your doctor for medical advice about side effects. You may report side effects to FDA at 8-096-UDH-6402.  Where should I keep my medicine?  Keep out of the reach of children.  See product for storage instructions. Each product may have different instructions.  NOTE: This sheet is a summary. It may not cover all possible information. If you have questions about this medicine, talk to your doctor, pharmacist, or health care provider.  © 2020 Elsevier/Gold Standard (2018-09-19 22:50:48)

## 2020-07-20 NOTE — ASSESSMENT & PLAN NOTE
Right frontal intraparenchymal hemorrhage without mass effect.  Non-operative management.  Post traumatic pharmacologic seizure prophylaxis for 1 week.  Speech Language Pathology cognitive evaluation.  Teofilo Laws MD. Neurosurgeon. [facility].
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
## 2020-07-20 NOTE — CONSULTS
Patient neuro intact through the ED  Finding of punctate intraparenchymal hematoma on CT head no mass-effect no large hematoma no subdural no skull fracture  L2 compression/burst fracture minimal involvement of middle column no retropulsion of fragments no collapse of pedicles minimal kyphotic deformity patient is prior L4-5 transforaminal interbody fusion with good arthrodesis.    Recommendations  Cranial   Every 4 hour neurochecks  Keppra 500 twice daily  Systolics less than 160  Full coagulation panel INR PT TEG If abnormal findings patient should be observed in ICU with every 4 hour neurochecks    As the patient is not on anticoagulation he may go to the floor for overnight observation with no need for repeat head CT unless he has altered mental status    Recommendations for lumbar compression fracture is off-the-shelf TLSO with standing films tomorrow to evaluate for stability and brace if patient is able to tolerate pain in brace along with standing films showing no progressive kyphosis he may be treated conservatively in the brace.    Full note to follow in the morning    Teofilo Laws MD

## 2020-07-20 NOTE — ASSESSMENT & PLAN NOTE
L3 compression/bust fracture.  Non-operative management.  Off the shelf TLSO bracing.  7/22 Plain films completed   Pt may sleep out of brace and may place it at bed side. He can removed for shower but should be on when ZORAIDA Laws MD. Neurosurgeon. [facility].

## 2020-07-20 NOTE — CARE PLAN
Problem: Safety  Goal: Will remain free from injury  Outcome: PROGRESSING AS EXPECTED   Bed in lowest position, call within reach, rounding in place, moderate risk for falls, bed alarm on  Problem: Infection  Goal: Will remain free from infection  Outcome: PROGRESSING AS EXPECTED   Standard precautions in place, patient educated on proper hand washing, nutrition promoted, IS used

## 2020-07-20 NOTE — ASSESSMENT & PLAN NOTE
Admission SARS-CoV-2 testing negative. LOW RISK patient. Repeat SARS-CoV-2 testing not indicated. Isolation precautions de-escalated.

## 2020-07-20 NOTE — CARE PLAN
Problem: Safety  Goal: Will remain free from falls  Outcome: PROGRESSING AS EXPECTED   Bed alarm on. Patient educated to call for assistance.     Problem: Knowledge Deficit  Goal: Knowledge of the prescribed therapeutic regimen will improve  Outcome: PROGRESSING SLOWER THAN EXPECTED   Patient frequently reeducated on use of TLSO brace and medication safety.

## 2020-07-20 NOTE — PROGRESS NOTES
Trauma Surgery SARS-CoV-2 Contact Precautions De-escalation Note    Admission SARS-CoV-2 PCR testing negative. LOW RISK patient. Repeat SARS-CoV-2 testing not indicated.   Isolation precautions de-escalated.     ____________________________________     Mark Miller M.D.    DD: 7/19/2020  11:10 PM

## 2020-07-20 NOTE — ASSESSMENT & PLAN NOTE
Systemic anticoagulation contraindicated secondary to elevated bleeding risk.  7/21 Trauma screening bilateral lower extremity venous duplex negative for above knee DVT.
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
## 2020-07-21 ENCOUNTER — APPOINTMENT (OUTPATIENT)
Dept: RADIOLOGY | Facility: MEDICAL CENTER | Age: 50
DRG: 551 | End: 2020-07-21
Attending: SURGERY
Payer: OTHER MISCELLANEOUS

## 2020-07-21 PROCEDURE — A9270 NON-COVERED ITEM OR SERVICE: HCPCS | Performed by: NURSE PRACTITIONER

## 2020-07-21 PROCEDURE — A9270 NON-COVERED ITEM OR SERVICE: HCPCS | Performed by: SURGERY

## 2020-07-21 PROCEDURE — 700111 HCHG RX REV CODE 636 W/ 250 OVERRIDE (IP): Performed by: SURGERY

## 2020-07-21 PROCEDURE — 770006 HCHG ROOM/CARE - MED/SURG/GYN SEMI*

## 2020-07-21 PROCEDURE — 700102 HCHG RX REV CODE 250 W/ 637 OVERRIDE(OP): Performed by: NURSE PRACTITIONER

## 2020-07-21 PROCEDURE — 700112 HCHG RX REV CODE 229: Performed by: SURGERY

## 2020-07-21 PROCEDURE — 700101 HCHG RX REV CODE 250: Performed by: SURGERY

## 2020-07-21 PROCEDURE — 92523 SPEECH SOUND LANG COMPREHEN: CPT

## 2020-07-21 PROCEDURE — 700102 HCHG RX REV CODE 250 W/ 637 OVERRIDE(OP): Performed by: SURGERY

## 2020-07-21 PROCEDURE — 700111 HCHG RX REV CODE 636 W/ 250 OVERRIDE (IP): Performed by: NURSE PRACTITIONER

## 2020-07-21 PROCEDURE — 93970 EXTREMITY STUDY: CPT

## 2020-07-21 RX ORDER — METAXALONE 800 MG/1
800 TABLET ORAL 3 TIMES DAILY
Status: DISCONTINUED | OUTPATIENT
Start: 2020-07-21 | End: 2020-07-25 | Stop reason: HOSPADM

## 2020-07-21 RX ORDER — MORPHINE SULFATE 15 MG/1
15 TABLET, FILM COATED, EXTENDED RELEASE ORAL EVERY 12 HOURS
Status: DISCONTINUED | OUTPATIENT
Start: 2020-07-21 | End: 2020-07-22

## 2020-07-21 RX ADMIN — ACETAMINOPHEN 1000 MG: 500 TABLET ORAL at 01:50

## 2020-07-21 RX ADMIN — DOCUSATE SODIUM 100 MG: 100 CAPSULE, LIQUID FILLED ORAL at 03:43

## 2020-07-21 RX ADMIN — DOCUSATE SODIUM 50 MG AND SENNOSIDES 8.6 MG 1 TABLET: 8.6; 5 TABLET, FILM COATED ORAL at 21:16

## 2020-07-21 RX ADMIN — OXYCODONE HYDROCHLORIDE 10 MG: 10 TABLET ORAL at 06:48

## 2020-07-21 RX ADMIN — THERA TABS 1 TABLET: TAB at 03:43

## 2020-07-21 RX ADMIN — DOCUSATE SODIUM 100 MG: 100 CAPSULE, LIQUID FILLED ORAL at 17:17

## 2020-07-21 RX ADMIN — Medication 100 MG: at 03:43

## 2020-07-21 RX ADMIN — METAXALONE 800 MG: 800 TABLET ORAL at 17:18

## 2020-07-21 RX ADMIN — ACETAMINOPHEN 1000 MG: 500 TABLET ORAL at 17:17

## 2020-07-21 RX ADMIN — MAGNESIUM HYDROXIDE 30 ML: 400 SUSPENSION ORAL at 03:44

## 2020-07-21 RX ADMIN — Medication 1 EACH: at 17:18

## 2020-07-21 RX ADMIN — POLYETHYLENE GLYCOL 3350 1 PACKET: 17 POWDER, FOR SOLUTION ORAL at 04:00

## 2020-07-21 RX ADMIN — MORPHINE SULFATE 15 MG: 15 TABLET, FILM COATED, EXTENDED RELEASE ORAL at 21:16

## 2020-07-21 RX ADMIN — Medication 1 EACH: at 03:58

## 2020-07-21 RX ADMIN — METAXALONE 800 MG: 800 TABLET ORAL at 11:36

## 2020-07-21 RX ADMIN — ACETAMINOPHEN 1000 MG: 500 TABLET ORAL at 09:49

## 2020-07-21 RX ADMIN — FOLIC ACID 1 MG: 1 TABLET ORAL at 03:43

## 2020-07-21 RX ADMIN — OXYCODONE HYDROCHLORIDE 10 MG: 10 TABLET ORAL at 09:49

## 2020-07-21 RX ADMIN — LEVETIRACETAM 500 MG: 500 TABLET ORAL at 03:43

## 2020-07-21 RX ADMIN — MORPHINE SULFATE 15 MG: 15 TABLET, FILM COATED, EXTENDED RELEASE ORAL at 11:36

## 2020-07-21 RX ADMIN — OXYCODONE HYDROCHLORIDE 10 MG: 10 TABLET ORAL at 21:16

## 2020-07-21 RX ADMIN — LEVETIRACETAM 500 MG: 500 TABLET ORAL at 17:17

## 2020-07-21 RX ADMIN — OXYCODONE HYDROCHLORIDE 10 MG: 10 TABLET ORAL at 18:36

## 2020-07-21 RX ADMIN — OXYCODONE HYDROCHLORIDE 10 MG: 10 TABLET ORAL at 03:44

## 2020-07-21 RX ADMIN — Medication 1 EACH: at 11:36

## 2020-07-21 RX ADMIN — OXYCODONE HYDROCHLORIDE 10 MG: 10 TABLET ORAL at 14:12

## 2020-07-21 RX ADMIN — POLYETHYLENE GLYCOL 3350 1 PACKET: 17 POWDER, FOR SOLUTION ORAL at 17:18

## 2020-07-21 RX ADMIN — FENTANYL CITRATE 50 MCG: 50 INJECTION INTRAMUSCULAR; INTRAVENOUS at 01:50

## 2020-07-21 ASSESSMENT — ENCOUNTER SYMPTOMS
SPEECH CHANGE: 0
ABDOMINAL PAIN: 0
ROS GI COMMENTS: PTA
SHORTNESS OF BREATH: 0
NAUSEA: 0
BLURRED VISION: 0
MYALGIAS: 1
BACK PAIN: 1
FEVER: 0
SENSORY CHANGE: 0

## 2020-07-21 ASSESSMENT — LIFESTYLE VARIABLES
TREMOR: NO TREMOR
VISUAL DISTURBANCES: NOT PRESENT
TREMOR: NO TREMOR
PAROXYSMAL SWEATS: NO SWEAT VISIBLE
NAUSEA AND VOMITING: NO NAUSEA AND NO VOMITING
AGITATION: NORMAL ACTIVITY
AUDITORY DISTURBANCES: NOT PRESENT
AGITATION: NORMAL ACTIVITY
ANXIETY: MILDLY ANXIOUS
VISUAL DISTURBANCES: NOT PRESENT
NAUSEA AND VOMITING: NO NAUSEA AND NO VOMITING
HEADACHE, FULLNESS IN HEAD: NOT PRESENT
VISUAL DISTURBANCES: NOT PRESENT
TOTAL SCORE: 1
ANXIETY: MILDLY ANXIOUS
TOTAL SCORE: 1
TREMOR: NO TREMOR
AGITATION: NORMAL ACTIVITY
HEADACHE, FULLNESS IN HEAD: NOT PRESENT
ORIENTATION AND CLOUDING OF SENSORIUM: ORIENTED AND CAN DO SERIAL ADDITIONS
NAUSEA AND VOMITING: NO NAUSEA AND NO VOMITING
ANXIETY: MILDLY ANXIOUS
ORIENTATION AND CLOUDING OF SENSORIUM: ORIENTED AND CAN DO SERIAL ADDITIONS
PAROXYSMAL SWEATS: NO SWEAT VISIBLE
TOTAL SCORE: 1
SUBSTANCE_ABUSE: 0
PAROXYSMAL SWEATS: NO SWEAT VISIBLE
ORIENTATION AND CLOUDING OF SENSORIUM: ORIENTED AND CAN DO SERIAL ADDITIONS
HEADACHE, FULLNESS IN HEAD: NOT PRESENT
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
## 2020-07-21 NOTE — PROGRESS NOTES
Neurosurgery Progress Note    Subjective:  *No events x-rays not complete    Exam:  Patient on the phone we walked in the room today his exam is unchanged    BP  Min: 100/64  Max: 132/80  Pulse  Av  Min: 64  Max: 89  Resp  Av.8  Min: 16  Max: 19  Temp  Av.3 °C (97.4 °F)  Min: 36.1 °C (97 °F)  Max: 36.8 °C (98.2 °F)  SpO2  Av.4 %  Min: 94 %  Max: 98 %    No data recorded    Recent Labs     207   WBC 14.4* 11.1*   RBC 5.51 5.14   HEMOGLOBIN 17.5 16.3   HEMATOCRIT 51.1 47.8   MCV 92.7 93.0   MCH 31.8 31.7   MCHC 34.2 34.1   RDW 46.7 47.3   PLATELETCT 161* 147*   MPV 10.6 10.3     Recent Labs     20   SODIUM 145 140   POTASSIUM 3.9 3.7   CHLORIDE 105 102   CO2 22 25   GLUCOSE 113* 130*   BUN 11 12   CREATININE 0.78 0.80   CALCIUM 9.0 8.5     Recent Labs     20   APTT 21.8*   INR 0.91     Recent Labs     20   REACTMIN 4.0*   CLOTKINET 3.3*   CLOTANGL 52.0*   MAXCLOTS 54.1   IDO45GYF 0.0   PRCINADP 79.7   PRCINAA 48.5       Intake/Output       20 - 20 0659 20 - 20 0659      1900-0659 Total 1900-0659 Total       Intake    P.O.  680  -- 680  560  -- 560    P.O. 680 -- 680 560 -- 560    Total Intake 680 -- 680 560 -- 560       Output    Urine  1  -- 1  --  -- --    Urine Void (mL) 1 -- 1 -- -- --    Total Output 1 -- 1 -- -- --       Net I/O     679 -- 259 560 -- 560            Intake/Output Summary (Last 24 hours) at 2020 1457  Last data filed at 2020 1200  Gross per 24 hour   Intake 840 ml   Output 1 ml   Net 839 ml            • metaxalone  800 mg TID   • morphine ER  15 mg Q12HRS   • Respiratory Therapy Consult   Continuous RT   • Pharmacy Consult Request  1 Each PHARMACY TO DOSE   • docusate sodium  100 mg BID   • senna-docusate  1 Tab Nightly   • senna-docusate  1 Tab Q24HRS PRN   • polyethylene glycol/lytes  1 Packet BID   • magnesium hydroxide  30 mL DAILY   •  bisacodyl  10 mg Q24HRS PRN   • fleet  1 Each Once PRN   • acetaminophen  1,000 mg Q6HRS   • oxyCODONE immediate-release  5 mg Q3HRS PRN   • oxyCODONE immediate release  10 mg Q3HRS PRN   • fentaNYL  50 mcg Q3HRS PRN   • ondansetron  4 mg Q4HRS PRN   • levETIRAcetam  500 mg BID   • bacitracin-polymyxin b   TID   • LORazepam  3 mg Q HOUR PRN    Or   • LORazepam  1.5 mg Q HOUR PRN   • LORazepam  4 mg Q15 MIN PRN    Or   • LORazepam  2 mg Q15 MIN PRN   • thiamine  100 mg DAILY    And   • multivitamin  1 Tab DAILY    And   • folic acid  1 mg DAILY       Assessment and Plan:  Hospital day #2  POD #na  Prophylactic anticoagulation: yes             Standing x-rays ordered not complete patient will need standing films in TLSO to confirm stability stability in the brace.  Additionally needs to tolerate from a pain standpoint the brace prior to being discharged.    If he is able to meet both his criteria he can be discharged home with follow-up in 6 weeks.  X-rays ordered this morning.    Please notify once completed and we will evaluate them for appropriateness for discharge  We will follow intermittently and check chart daily for x-ray completion.    Total 30 minutes was spent direct patient care coordination consultation
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
## 2020-07-22 ENCOUNTER — APPOINTMENT (OUTPATIENT)
Dept: RADIOLOGY | Facility: MEDICAL CENTER | Age: 50
DRG: 551 | End: 2020-07-22
Attending: NEUROLOGICAL SURGERY
Payer: OTHER MISCELLANEOUS

## 2020-07-22 PROCEDURE — 700101 HCHG RX REV CODE 250: Performed by: SURGERY

## 2020-07-22 PROCEDURE — 700112 HCHG RX REV CODE 229: Performed by: SURGERY

## 2020-07-22 PROCEDURE — A9270 NON-COVERED ITEM OR SERVICE: HCPCS | Performed by: SURGERY

## 2020-07-22 PROCEDURE — 700102 HCHG RX REV CODE 250 W/ 637 OVERRIDE(OP): Performed by: SURGERY

## 2020-07-22 PROCEDURE — 700102 HCHG RX REV CODE 250 W/ 637 OVERRIDE(OP): Performed by: NURSE PRACTITIONER

## 2020-07-22 PROCEDURE — A9270 NON-COVERED ITEM OR SERVICE: HCPCS | Performed by: NURSE PRACTITIONER

## 2020-07-22 PROCEDURE — 770006 HCHG ROOM/CARE - MED/SURG/GYN SEMI*

## 2020-07-22 PROCEDURE — 72100 X-RAY EXAM L-S SPINE 2/3 VWS: CPT

## 2020-07-22 RX ORDER — DIPHENHYDRAMINE HCL 25 MG
25 TABLET ORAL EVERY 6 HOURS PRN
Status: DISCONTINUED | OUTPATIENT
Start: 2020-07-22 | End: 2020-07-25 | Stop reason: HOSPADM

## 2020-07-22 RX ADMIN — Medication 1 EACH: at 04:57

## 2020-07-22 RX ADMIN — POLYETHYLENE GLYCOL 3350 1 PACKET: 17 POWDER, FOR SOLUTION ORAL at 07:50

## 2020-07-22 RX ADMIN — OXYCODONE HYDROCHLORIDE 10 MG: 10 TABLET ORAL at 18:40

## 2020-07-22 RX ADMIN — METAXALONE 800 MG: 800 TABLET ORAL at 17:48

## 2020-07-22 RX ADMIN — MAGNESIUM HYDROXIDE 30 ML: 400 SUSPENSION ORAL at 07:46

## 2020-07-22 RX ADMIN — DIPHENHYDRAMINE HYDROCHLORIDE 25 MG: 25 TABLET ORAL at 14:54

## 2020-07-22 RX ADMIN — BISACODYL 10 MG: 10 SUPPOSITORY RECTAL at 17:53

## 2020-07-22 RX ADMIN — DOCUSATE SODIUM 100 MG: 100 CAPSULE, LIQUID FILLED ORAL at 04:52

## 2020-07-22 RX ADMIN — DIPHENHYDRAMINE HYDROCHLORIDE 25 MG: 25 TABLET ORAL at 23:20

## 2020-07-22 RX ADMIN — Medication 1 EACH: at 11:29

## 2020-07-22 RX ADMIN — OXYCODONE HYDROCHLORIDE 10 MG: 10 TABLET ORAL at 01:33

## 2020-07-22 RX ADMIN — ACETAMINOPHEN 1000 MG: 500 TABLET ORAL at 11:32

## 2020-07-22 RX ADMIN — DOCUSATE SODIUM 100 MG: 100 CAPSULE, LIQUID FILLED ORAL at 17:48

## 2020-07-22 RX ADMIN — FOLIC ACID 1 MG: 1 TABLET ORAL at 04:52

## 2020-07-22 RX ADMIN — OXYCODONE HYDROCHLORIDE 10 MG: 10 TABLET ORAL at 07:47

## 2020-07-22 RX ADMIN — ACETAMINOPHEN 1000 MG: 500 TABLET ORAL at 17:48

## 2020-07-22 RX ADMIN — BISACODYL 10 MG: 10 SUPPOSITORY RECTAL at 04:53

## 2020-07-22 RX ADMIN — OXYCODONE HYDROCHLORIDE 10 MG: 10 TABLET ORAL at 10:29

## 2020-07-22 RX ADMIN — LEVETIRACETAM 500 MG: 500 TABLET ORAL at 17:48

## 2020-07-22 RX ADMIN — Medication 1 EACH: at 18:23

## 2020-07-22 RX ADMIN — Medication 100 MG: at 04:56

## 2020-07-22 RX ADMIN — OXYCODONE HYDROCHLORIDE 10 MG: 10 TABLET ORAL at 22:11

## 2020-07-22 RX ADMIN — LEVETIRACETAM 500 MG: 500 TABLET ORAL at 04:52

## 2020-07-22 RX ADMIN — ACETAMINOPHEN 1000 MG: 500 TABLET ORAL at 01:33

## 2020-07-22 RX ADMIN — SODIUM PHOSPHATE 133 ML: 7; 19 ENEMA RECTAL at 14:54

## 2020-07-22 RX ADMIN — THERA TABS 1 TABLET: TAB at 04:52

## 2020-07-22 RX ADMIN — METAXALONE 800 MG: 800 TABLET ORAL at 04:52

## 2020-07-22 RX ADMIN — DOCUSATE SODIUM 50 MG AND SENNOSIDES 8.6 MG 1 TABLET: 8.6; 5 TABLET, FILM COATED ORAL at 22:11

## 2020-07-22 RX ADMIN — METAXALONE 800 MG: 800 TABLET ORAL at 11:28

## 2020-07-22 RX ADMIN — ACETAMINOPHEN 1000 MG: 500 TABLET ORAL at 04:52

## 2020-07-22 RX ADMIN — OXYCODONE HYDROCHLORIDE 10 MG: 10 TABLET ORAL at 04:52

## 2020-07-22 RX ADMIN — POLYETHYLENE GLYCOL 3350 1 PACKET: 17 POWDER, FOR SOLUTION ORAL at 17:48

## 2020-07-22 RX ADMIN — NICOTINE 7 MG: 7 PATCH TRANSDERMAL at 11:28

## 2020-07-22 ASSESSMENT — ENCOUNTER SYMPTOMS
ABDOMINAL PAIN: 0
FEVER: 0
ROS GI COMMENTS: PTA
SENSORY CHANGE: 0
BACK PAIN: 1
MYALGIAS: 1
SHORTNESS OF BREATH: 0
NAUSEA: 0
BLURRED VISION: 0
SPEECH CHANGE: 0

## 2020-07-22 ASSESSMENT — LIFESTYLE VARIABLES: SUBSTANCE_ABUSE: 0

## 2020-07-22 NOTE — CARE PLAN
Problem: Communication  Goal: The ability to communicate needs accurately and effectively will improve  Outcome: PROGRESSING AS EXPECTED  Note: Plan of Care discussed, all questions answered. Pt effectively communicates needs to staff.       Problem: Bowel/Gastric:  Goal: Normal bowel function is maintained or improved  Outcome: PROGRESSING SLOWER THAN EXPECTED  Note: Suppository given this morning, pt wanting to wait before enema.
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
## 2020-07-22 NOTE — DISCHARGE PLANNING
Anticipated Discharge Disposition: Acute Rehab    Action: Emailed PFA to screen pt for insurance or medicaid.  Pt has Medicare A&B, reflected on pts facesheet.    Barriers to Discharge: Insurance auth, Acute Rehab acceptance    Plan: f/u with medical team, pt
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
## 2020-07-22 NOTE — CARE PLAN
Problem: Safety  Goal: Will remain free from injury  Note: Call light and belongings within reach, bed down and locked, hourly rounding in progress. Treaded socks in use, no DME at bedside.  pt calls appropriately.       Problem: Infection  Goal: Will remain free from infection  Note: Standard precautions in place.

## 2020-07-23 PROCEDURE — A9270 NON-COVERED ITEM OR SERVICE: HCPCS | Performed by: SURGERY

## 2020-07-23 PROCEDURE — A9270 NON-COVERED ITEM OR SERVICE: HCPCS | Performed by: NURSE PRACTITIONER

## 2020-07-23 PROCEDURE — 700102 HCHG RX REV CODE 250 W/ 637 OVERRIDE(OP): Performed by: SURGERY

## 2020-07-23 PROCEDURE — 700112 HCHG RX REV CODE 229: Performed by: SURGERY

## 2020-07-23 PROCEDURE — 700101 HCHG RX REV CODE 250: Performed by: SURGERY

## 2020-07-23 PROCEDURE — 700102 HCHG RX REV CODE 250 W/ 637 OVERRIDE(OP): Performed by: NURSE PRACTITIONER

## 2020-07-23 PROCEDURE — 770006 HCHG ROOM/CARE - MED/SURG/GYN SEMI*

## 2020-07-23 PROCEDURE — 700101 HCHG RX REV CODE 250: Performed by: NURSE PRACTITIONER

## 2020-07-23 RX ORDER — OXYCODONE HYDROCHLORIDE 5 MG/1
5 TABLET ORAL EVERY 6 HOURS PRN
Status: DISCONTINUED | OUTPATIENT
Start: 2020-07-23 | End: 2020-07-25 | Stop reason: HOSPADM

## 2020-07-23 RX ORDER — ENEMA 19; 7 G/133ML; G/133ML
1 ENEMA RECTAL PRN
Status: DISCONTINUED | OUTPATIENT
Start: 2020-07-23 | End: 2020-07-25 | Stop reason: HOSPADM

## 2020-07-23 RX ORDER — OXYCODONE HYDROCHLORIDE 5 MG/1
5 TABLET ORAL ONCE
Status: DISPENSED | OUTPATIENT
Start: 2020-07-23 | End: 2020-07-24

## 2020-07-23 RX ORDER — MORPHINE SULFATE 15 MG/1
15 TABLET, FILM COATED, EXTENDED RELEASE ORAL EVERY 12 HOURS
Status: DISCONTINUED | OUTPATIENT
Start: 2020-07-23 | End: 2020-07-25 | Stop reason: HOSPADM

## 2020-07-23 RX ORDER — GABAPENTIN 300 MG/1
300 CAPSULE ORAL 3 TIMES DAILY
Status: DISCONTINUED | OUTPATIENT
Start: 2020-07-23 | End: 2020-07-25 | Stop reason: HOSPADM

## 2020-07-23 RX ADMIN — METAXALONE 800 MG: 800 TABLET ORAL at 05:15

## 2020-07-23 RX ADMIN — MAGNESIUM HYDROXIDE 30 ML: 400 SUSPENSION ORAL at 05:17

## 2020-07-23 RX ADMIN — Medication 1 EACH: at 17:48

## 2020-07-23 RX ADMIN — OXYCODONE HYDROCHLORIDE 10 MG: 10 TABLET ORAL at 05:15

## 2020-07-23 RX ADMIN — DOCUSATE SODIUM 100 MG: 100 CAPSULE, LIQUID FILLED ORAL at 17:49

## 2020-07-23 RX ADMIN — THERA TABS 1 TABLET: TAB at 05:19

## 2020-07-23 RX ADMIN — NICOTINE 7 MG: 7 PATCH TRANSDERMAL at 05:19

## 2020-07-23 RX ADMIN — MAGNESIUM CITRATE 148 ML: 1.75 LIQUID ORAL at 17:54

## 2020-07-23 RX ADMIN — MORPHINE SULFATE 15 MG: 15 TABLET, FILM COATED, EXTENDED RELEASE ORAL at 11:13

## 2020-07-23 RX ADMIN — Medication 1 EACH: at 11:13

## 2020-07-23 RX ADMIN — SODIUM PHOSPHATE 133 ML: 7; 19 ENEMA RECTAL at 15:30

## 2020-07-23 RX ADMIN — LEVETIRACETAM 500 MG: 500 TABLET ORAL at 05:18

## 2020-07-23 RX ADMIN — FOLIC ACID 1 MG: 1 TABLET ORAL at 05:16

## 2020-07-23 RX ADMIN — ACETAMINOPHEN 1000 MG: 500 TABLET ORAL at 11:13

## 2020-07-23 RX ADMIN — METAXALONE 800 MG: 800 TABLET ORAL at 11:13

## 2020-07-23 RX ADMIN — METAXALONE 800 MG: 800 TABLET ORAL at 17:49

## 2020-07-23 RX ADMIN — POLYETHYLENE GLYCOL 3350 1 PACKET: 17 POWDER, FOR SOLUTION ORAL at 05:19

## 2020-07-23 RX ADMIN — DIPHENHYDRAMINE HYDROCHLORIDE 25 MG: 25 TABLET ORAL at 08:26

## 2020-07-23 RX ADMIN — ACETAMINOPHEN 1000 MG: 500 TABLET ORAL at 05:17

## 2020-07-23 RX ADMIN — MAGNESIUM CITRATE 148 ML: 1.75 LIQUID ORAL at 13:51

## 2020-07-23 RX ADMIN — OXYCODONE 5 MG: 5 TABLET ORAL at 15:12

## 2020-07-23 RX ADMIN — DOCUSATE SODIUM 100 MG: 100 CAPSULE, LIQUID FILLED ORAL at 05:16

## 2020-07-23 RX ADMIN — ACETAMINOPHEN 1000 MG: 500 TABLET ORAL at 17:48

## 2020-07-23 RX ADMIN — GABAPENTIN 300 MG: 300 CAPSULE ORAL at 17:48

## 2020-07-23 RX ADMIN — OXYCODONE HYDROCHLORIDE 10 MG: 10 TABLET ORAL at 02:05

## 2020-07-23 RX ADMIN — GABAPENTIN 300 MG: 300 CAPSULE ORAL at 13:07

## 2020-07-23 RX ADMIN — BISACODYL 10 MG: 10 SUPPOSITORY RECTAL at 11:25

## 2020-07-23 RX ADMIN — Medication 100 MG: at 05:19

## 2020-07-23 RX ADMIN — Medication 1 EACH: at 05:15

## 2020-07-23 RX ADMIN — OXYCODONE 5 MG: 5 TABLET ORAL at 15:27

## 2020-07-23 RX ADMIN — LEVETIRACETAM 500 MG: 500 TABLET ORAL at 17:49

## 2020-07-23 RX ADMIN — OXYCODONE HYDROCHLORIDE 10 MG: 10 TABLET ORAL at 08:26

## 2020-07-23 ASSESSMENT — ENCOUNTER SYMPTOMS
SPEECH CHANGE: 0
MYALGIAS: 1
BACK PAIN: 1
ABDOMINAL PAIN: 0
ROS GI COMMENTS: PTA
CONSTIPATION: 1
FEVER: 0
SENSORY CHANGE: 0
SHORTNESS OF BREATH: 0
BLURRED VISION: 0
NAUSEA: 0

## 2020-07-23 ASSESSMENT — LIFESTYLE VARIABLES: SUBSTANCE_ABUSE: 0

## 2020-07-23 ASSESSMENT — FIBROSIS 4 INDEX: FIB4 SCORE: 4.35
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
## 2020-07-23 NOTE — CARE PLAN
Problem: Nutritional:  Goal: Achieve adequate nutritional intake  Description: Patient will consume >50% of meals  Outcome: MET   Per ADL documentation pt has consumed % of all meals (10 meals have been documented since admit). RD available prn - and will monitor per dept policy

## 2020-07-23 NOTE — PROGRESS NOTES
Pt found with another can of tobacco that he was keeping in the mirror cabinet above the sink, another RN found pt with dip in mouth. Can placed in med drawer.   In the AM pt was angry that tobacco was taken. Pt educated again on hospital policy.

## 2020-07-23 NOTE — PROGRESS NOTES
XR shows stable level of kyphosis standing in brace.    Plan   PT clear for DC in TLSO with FU in 6 weeks   Pt may sleep out of brace and may place it at bed side. He can removed for shower but should be on when OOB    Laws

## 2020-07-23 NOTE — CARE PLAN
Problem: Safety  Goal: Will remain free from falls  Outcome: PROGRESSING AS EXPECTED     Problem: Mobility  Goal: Risk for activity intolerance will decrease  Outcome: PROGRESSING AS EXPECTED   Ambulating using a FWW

## 2020-07-23 NOTE — CARE PLAN
Problem: Pain Management  Goal: Pain level will decrease to patient's comfort goal  Outcome: PROGRESSING AS EXPECTED  Intervention: Follow pain managment plan developed in collaboration with patient and Interdisciplinary Team  Note: Patient rates pain 8-10/10 to back. TLSO brace when Oob. Medications changed to mscontin q12 and oxy 5mg q6.     Problem: Bowel/Gastric:  Goal: Will not experience complications related to bowel motility  Outcome: PROGRESSING SLOWER THAN EXPECTED  Intervention: Assess baseline bowel pattern  Note: No BM since 7/19. Abd semi-firm, hyperactive bowel sounds, patient passing flatus. MOM/miralax/suppository and mag citrate given with no success. Will administer fleets.

## 2020-07-23 NOTE — FACE TO FACE
Face to Face Supporting Documentation - Home Health    The encounter with this patient was in whole or in part the primary reason for home health admission.    Date of encounter:   Patient:                    MRN:                       YOB: 2022  Sanjiv Cali  [id number]  1970     Home health to see patient for:  Skilled Nursing care for assessment, interventions & education, Physical Therapy evaluation and treatment and Occupational therapy evaluation and treatment    Skilled need for:  New Onset Medical Diagnosis compression fracture    Skilled nursing interventions to include:  Comment: home assessment     Homebound status evidenced by:  Need the aid of supportive devices such as crutches, canes, wheelchairs or walkers or Needs the assistance of another person in order to leave the home. Leaving home requires a considerable and taxing effort. There is a normal inability to leave the home.    Community Physician to provide follow up care: No primary care provider on file.     Optional Interventions? No      I certify the face to face encounter for this home health care referral meets the CMS requirements and the encounter/clinical assessment with the patient was, in whole, or in part, for the medical condition(s) listed above, which is the primary reason for home health care. Based on my clinical findings: the service(s) are medically necessary, support the need for home health care, and the homebound criteria are met.  I certify that this patient has had a face to face encounter by the nurse practitioner working collaboratively with me.  ROSE MARY Garner. - NPI: [id number]

## 2020-07-23 NOTE — PROGRESS NOTES
1020 Page sent to Dr. Laws's PA to confirm that Dr. Laws read xray results and is cleared from neurosurgery dc.     1100 Melisa called back. Melisa will discuss with Dr. Laws.     [id number] Nayely SIDDIQUIEDELMIRA notified that patient still hasn't had a BM after 1/2 mag citrate and fleets enema. Okay to give the other half of mag citrate. One time order for oxy 5mg given. Likely dc tomorrow after BM and pain control.

## 2020-07-24 PROCEDURE — A9270 NON-COVERED ITEM OR SERVICE: HCPCS | Performed by: NURSE PRACTITIONER

## 2020-07-24 PROCEDURE — 700102 HCHG RX REV CODE 250 W/ 637 OVERRIDE(OP): Performed by: NURSE PRACTITIONER

## 2020-07-24 PROCEDURE — A9270 NON-COVERED ITEM OR SERVICE: HCPCS | Performed by: SURGERY

## 2020-07-24 PROCEDURE — 700101 HCHG RX REV CODE 250: Performed by: SURGERY

## 2020-07-24 PROCEDURE — 770006 HCHG ROOM/CARE - MED/SURG/GYN SEMI*

## 2020-07-24 PROCEDURE — 700111 HCHG RX REV CODE 636 W/ 250 OVERRIDE (IP): Performed by: NURSE PRACTITIONER

## 2020-07-24 PROCEDURE — 700102 HCHG RX REV CODE 250 W/ 637 OVERRIDE(OP): Performed by: SURGERY

## 2020-07-24 PROCEDURE — 700101 HCHG RX REV CODE 250: Performed by: NURSE PRACTITIONER

## 2020-07-24 PROCEDURE — 700112 HCHG RX REV CODE 229: Performed by: SURGERY

## 2020-07-24 RX ORDER — MORPHINE SULFATE 15 MG/1
15 TABLET, FILM COATED, EXTENDED RELEASE ORAL EVERY 12 HOURS
Qty: 14 TAB | Refills: 0 | Status: SHIPPED | OUTPATIENT
Start: 2020-07-24 | End: 2020-07-31

## 2020-07-24 RX ORDER — LEVETIRACETAM 500 MG/1
500 TABLET ORAL 2 TIMES DAILY
Qty: 4 TAB | Refills: 0 | Status: SHIPPED | OUTPATIENT
Start: 2020-07-24 | End: 2020-07-26

## 2020-07-24 RX ORDER — ACETAMINOPHEN 500 MG
1000 TABLET ORAL EVERY 6 HOURS
Qty: 30 TAB | Refills: 0 | COMMUNITY
Start: 2020-07-24

## 2020-07-24 RX ORDER — LIDOCAINE 50 MG/G
1 PATCH TOPICAL EVERY 24 HOURS
Status: DISCONTINUED | OUTPATIENT
Start: 2020-07-24 | End: 2020-07-25 | Stop reason: HOSPADM

## 2020-07-24 RX ORDER — LIDOCAINE 50 MG/G
1 PATCH TOPICAL EVERY 24 HOURS
Qty: 10 PATCH | Refills: 0 | Status: SHIPPED | OUTPATIENT
Start: 2020-07-25

## 2020-07-24 RX ORDER — OXYCODONE HYDROCHLORIDE 5 MG/1
5 TABLET ORAL EVERY 6 HOURS PRN
Qty: 20 TAB | Refills: 0 | Status: SHIPPED | OUTPATIENT
Start: 2020-07-24 | End: 2020-07-31

## 2020-07-24 RX ORDER — METAXALONE 800 MG/1
800 TABLET ORAL 3 TIMES DAILY PRN
Qty: 48 TAB | Refills: 0 | Status: SHIPPED | OUTPATIENT
Start: 2020-07-24

## 2020-07-24 RX ORDER — GABAPENTIN 300 MG/1
300 CAPSULE ORAL 3 TIMES DAILY
Qty: 63 CAP | Refills: 0 | Status: SHIPPED | OUTPATIENT
Start: 2020-07-24 | End: 2020-08-14

## 2020-07-24 RX ADMIN — OXYCODONE 5 MG: 5 TABLET ORAL at 12:45

## 2020-07-24 RX ADMIN — MORPHINE SULFATE 15 MG: 15 TABLET, FILM COATED, EXTENDED RELEASE ORAL at 17:16

## 2020-07-24 RX ADMIN — GABAPENTIN 300 MG: 300 CAPSULE ORAL at 06:47

## 2020-07-24 RX ADMIN — ACETAMINOPHEN 1000 MG: 500 TABLET ORAL at 12:45

## 2020-07-24 RX ADMIN — ENOXAPARIN SODIUM 30 MG: 30 INJECTION SUBCUTANEOUS at 12:44

## 2020-07-24 RX ADMIN — MORPHINE SULFATE 15 MG: 15 TABLET, FILM COATED, EXTENDED RELEASE ORAL at 06:45

## 2020-07-24 RX ADMIN — POLYETHYLENE GLYCOL 3350 1 PACKET: 17 POWDER, FOR SOLUTION ORAL at 06:46

## 2020-07-24 RX ADMIN — OXYCODONE 5 MG: 5 TABLET ORAL at 00:15

## 2020-07-24 RX ADMIN — LEVETIRACETAM 500 MG: 500 TABLET ORAL at 06:46

## 2020-07-24 RX ADMIN — DOCUSATE SODIUM 100 MG: 100 CAPSULE, LIQUID FILLED ORAL at 06:46

## 2020-07-24 RX ADMIN — ACETAMINOPHEN 1000 MG: 500 TABLET ORAL at 00:15

## 2020-07-24 RX ADMIN — GABAPENTIN 300 MG: 300 CAPSULE ORAL at 17:16

## 2020-07-24 RX ADMIN — Medication 1 EACH: at 12:45

## 2020-07-24 RX ADMIN — MAGNESIUM HYDROXIDE 30 ML: 400 SUSPENSION ORAL at 06:46

## 2020-07-24 RX ADMIN — Medication 1 EACH: at 17:49

## 2020-07-24 RX ADMIN — NICOTINE 7 MG: 7 PATCH TRANSDERMAL at 06:47

## 2020-07-24 RX ADMIN — LEVETIRACETAM 500 MG: 500 TABLET ORAL at 17:16

## 2020-07-24 RX ADMIN — OXYCODONE 5 MG: 5 TABLET ORAL at 19:51

## 2020-07-24 RX ADMIN — METAXALONE 800 MG: 800 TABLET ORAL at 17:15

## 2020-07-24 RX ADMIN — DOCUSATE SODIUM 100 MG: 100 CAPSULE, LIQUID FILLED ORAL at 17:16

## 2020-07-24 RX ADMIN — POLYETHYLENE GLYCOL 3350 1 PACKET: 17 POWDER, FOR SOLUTION ORAL at 17:16

## 2020-07-24 RX ADMIN — ACETAMINOPHEN 1000 MG: 500 TABLET ORAL at 17:16

## 2020-07-24 RX ADMIN — LIDOCAINE 1 PATCH: 50 PATCH TOPICAL at 09:24

## 2020-07-24 RX ADMIN — Medication 1 EACH: at 06:46

## 2020-07-24 RX ADMIN — METAXALONE 800 MG: 800 TABLET ORAL at 12:45

## 2020-07-24 RX ADMIN — GABAPENTIN 300 MG: 300 CAPSULE ORAL at 12:45

## 2020-07-24 RX ADMIN — METAXALONE 800 MG: 800 TABLET ORAL at 06:47

## 2020-07-24 RX ADMIN — ACETAMINOPHEN 1000 MG: 500 TABLET ORAL at 06:45

## 2020-07-24 RX ADMIN — DOCUSATE SODIUM 50 MG AND SENNOSIDES 8.6 MG 1 TABLET: 8.6; 5 TABLET, FILM COATED ORAL at 00:15

## 2020-07-24 RX ADMIN — DOCUSATE SODIUM 50 MG AND SENNOSIDES 8.6 MG 1 TABLET: 8.6; 5 TABLET, FILM COATED ORAL at 19:51

## 2020-07-24 ASSESSMENT — ENCOUNTER SYMPTOMS
SENSORY CHANGE: 0
SPEECH CHANGE: 0
FEVER: 0
NAUSEA: 0
SHORTNESS OF BREATH: 0
ABDOMINAL PAIN: 0
MYALGIAS: 1
ROS GI COMMENTS: BM 7/23
BACK PAIN: 1
BLURRED VISION: 0

## 2020-07-24 ASSESSMENT — LIFESTYLE VARIABLES: SUBSTANCE_ABUSE: 0

## 2020-07-24 NOTE — DISCHARGE PLANNING
Anticipated Discharge Disposition: Home with HH    Action: Approved services for pts meds submitted to [facility] pharmacy.    Metaxolone $30.23, Lidocaine Patch $20.02, Kepra $7.65, Gabapentin $9.14    Barriers to Discharge: HH acceptance    Plan: f/u with
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
## 2020-07-24 NOTE — CARE PLAN
Problem: Pain Management  Goal: Pain level will decrease to patient's comfort goal  Outcome: PROGRESSING AS EXPECTED   PRN pain meds in use    Problem: Mobility  Goal: Risk for activity intolerance will decrease  Outcome: PROGRESSING AS EXPECTED   Ambulates with use of a FWW; up ad radhika

## 2020-07-24 NOTE — DISCHARGE PLANNING
Anticipated Discharge Disposition: Home with HH    Action: Pt stays at a campground at [facility]. Contacted [facility] for PCP appointment, pt has an appt with Dr. Christopher on Monday 7/27 at 1330.  Appt address is [address] [place] [address].  HH choice faxed to [facility]. Pt states that he can take a cab home on discharge    Barriers to Discharge: HH acceptance    Plan: f/u with medical team, pt, cca

## 2020-07-24 NOTE — CARE PLAN
Problem: Pain Management  Goal: Pain level will decrease to patient's comfort goal  Outcome: PROGRESSING AS EXPECTED  Note:  Pain is being controlled with current prescribed interventions.      Problem: Respiratory:  Goal: Respiratory status will improve  Outcome: PROGRESSING AS EXPECTED  Note:  Patient on RA no issues with oxygenation.

## 2020-07-24 NOTE — DISCHARGE PLANNING
Received Choice form at 5911  Agency/Facility Name: [facility]  Referral sent per Choice form @ [id number]

## 2020-07-25 VITALS
SYSTOLIC BLOOD PRESSURE: 126 MMHG | HEART RATE: 51 BPM | WEIGHT: 181.22 LBS | DIASTOLIC BLOOD PRESSURE: 84 MMHG | OXYGEN SATURATION: 99 % | TEMPERATURE: 97.8 F | BODY MASS INDEX: 25.94 KG/M2 | RESPIRATION RATE: 18 BRPM | HEIGHT: 70 IN

## 2020-07-25 PROBLEM — Z53.09 CONTRAINDICATION FOR MECHANICAL VENOUS THROMBOEMBOLISM (VTE) PROPHYLAXIS: Status: RESOLVED | Noted: 2020-07-19 | Resolved: 2020-07-25

## 2020-07-25 PROCEDURE — 700101 HCHG RX REV CODE 250: Performed by: NURSE PRACTITIONER

## 2020-07-25 PROCEDURE — A9270 NON-COVERED ITEM OR SERVICE: HCPCS | Performed by: NURSE PRACTITIONER

## 2020-07-25 PROCEDURE — 700111 HCHG RX REV CODE 636 W/ 250 OVERRIDE (IP): Performed by: NURSE PRACTITIONER

## 2020-07-25 PROCEDURE — 97168 OT RE-EVAL EST PLAN CARE: CPT

## 2020-07-25 PROCEDURE — 700112 HCHG RX REV CODE 229: Performed by: SURGERY

## 2020-07-25 PROCEDURE — 700102 HCHG RX REV CODE 250 W/ 637 OVERRIDE(OP): Performed by: SURGERY

## 2020-07-25 PROCEDURE — 97164 PT RE-EVAL EST PLAN CARE: CPT

## 2020-07-25 PROCEDURE — A9270 NON-COVERED ITEM OR SERVICE: HCPCS | Performed by: SURGERY

## 2020-07-25 PROCEDURE — 700102 HCHG RX REV CODE 250 W/ 637 OVERRIDE(OP): Performed by: NURSE PRACTITIONER

## 2020-07-25 PROCEDURE — 700101 HCHG RX REV CODE 250: Performed by: SURGERY

## 2020-07-25 RX ADMIN — METAXALONE 800 MG: 800 TABLET ORAL at 12:09

## 2020-07-25 RX ADMIN — OXYCODONE 5 MG: 5 TABLET ORAL at 02:35

## 2020-07-25 RX ADMIN — OXYCODONE 5 MG: 5 TABLET ORAL at 08:37

## 2020-07-25 RX ADMIN — LEVETIRACETAM 500 MG: 500 TABLET ORAL at 05:25

## 2020-07-25 RX ADMIN — Medication 1 EACH: at 05:26

## 2020-07-25 RX ADMIN — GABAPENTIN 300 MG: 300 CAPSULE ORAL at 05:25

## 2020-07-25 RX ADMIN — ACETAMINOPHEN 1000 MG: 500 TABLET ORAL at 02:35

## 2020-07-25 RX ADMIN — GABAPENTIN 300 MG: 300 CAPSULE ORAL at 12:09

## 2020-07-25 RX ADMIN — MORPHINE SULFATE 15 MG: 15 TABLET, FILM COATED, EXTENDED RELEASE ORAL at 05:25

## 2020-07-25 RX ADMIN — NICOTINE 7 MG: 7 PATCH TRANSDERMAL at 05:25

## 2020-07-25 RX ADMIN — ENOXAPARIN SODIUM 30 MG: 30 INJECTION SUBCUTANEOUS at 02:35

## 2020-07-25 RX ADMIN — METAXALONE 800 MG: 800 TABLET ORAL at 05:25

## 2020-07-25 RX ADMIN — LIDOCAINE 1 PATCH: 50 PATCH TOPICAL at 08:06

## 2020-07-25 RX ADMIN — DOCUSATE SODIUM 100 MG: 100 CAPSULE, LIQUID FILLED ORAL at 05:25

## 2020-07-25 ASSESSMENT — LIFESTYLE VARIABLES: SUBSTANCE_ABUSE: 0

## 2020-07-25 ASSESSMENT — COGNITIVE AND FUNCTIONAL STATUS - GENERAL
MOBILITY SCORE: 21
SUGGESTED CMS G CODE MODIFIER DAILY ACTIVITY: CI
SUGGESTED CMS G CODE MODIFIER MOBILITY: CJ
DAILY ACTIVITIY SCORE: 23
MOVING FROM LYING ON BACK TO SITTING ON SIDE OF FLAT BED: UNABLE
HELP NEEDED FOR BATHING: A LITTLE

## 2020-07-25 ASSESSMENT — ENCOUNTER SYMPTOMS
BLURRED VISION: 0
FEVER: 0
BACK PAIN: 1
MYALGIAS: 1
NAUSEA: 0
ABDOMINAL PAIN: 0
SHORTNESS OF BREATH: 0
SPEECH CHANGE: 0
SENSORY CHANGE: 0
ROS GI COMMENTS: BM 7/23

## 2020-07-25 ASSESSMENT — GAIT ASSESSMENTS
ASSISTIVE DEVICE: FRONT WHEEL WALKER
DEVIATION: INCREASED BASE OF SUPPORT
GAIT LEVEL OF ASSIST: SUPERVISED
DISTANCE (FEET): 200

## 2020-07-25 NOTE — THERAPY
Occupational Therapy   Re- Evaluation     Patient Name: Sanjiv Cali  Age:  50 y.o., Sex:  male  Medical Record #: [id number]  Today's Date: 7/25/2020     Precautions  Precautions: Fall Risk, TLSO (Thoracolumbosacral orthosis), Spinal / Back Precautions   Comments: TLSO donned eob, no formal orders in place    Assessment  Patient seen for re-eval per trauma team recommendation. Pt was able to perform basic self care tasks, functional mobility and t/f's with supervision using FWW including donning/doffing TLSO, recommended sponge bathing as pt reports he can't fit a SC/bench in his shower in RV. Pt is close to his baseline and no fruther acute OT needs at this time.     Plan    Recommend Occupational Therapy for Evaluation only     Discharge recommendations:  Anticipate that the patient will have no further occupational therapy needs after discharge from the hospital.        Objective       07/25/20 1011   Other Treatments   Other Treatments Provided Reports will go back to his RV at [facility]. Pt was educated on LB AE and use of SC and how to obtain. Recommended sponge bathing for awhile   Balance   Sitting Balance (Static) Good   Sitting Balance (Dynamic) Fair +   Standing Balance (Static) Fair +   Standing Balance (Dynamic) Fair   Weight Shift Sitting Good   Weight Shift Standing Good   Skilled Intervention Verbal Cuing   Comments w/FWW, no lob noted   Bed Mobility    Supine to Sit Modified Independent   Sit to Supine Modified Independent   Scooting Modified Independent   Rolling Modified Independent   Skilled Intervention Other (See Comments)  (flat hob and w/o bed rails, no vc's needed for log rolling)   Activities of Daily Living   Eating Independent   Grooming Modified Independent;Standing  (simulated)   Bathing   (discussed AE which pt refused, recommended sponge bathing)   Upper Body Dressing Supervision  (donned/doffed TLSO )   Lower Body Dressing Supervision   Toileting Supervision   Skilled Intervention  Verbal Cuing   Functional Mobility   Sit to Stand Supervised   Bed, Chair, Wheelchair Transfer Supervised   Toilet Transfers Refused  (refused reports no concerns with mobility)   Mobility within room and hallway   Anticipated Discharge Equipment   DC Equipment Front-Wheel Walker;Tub Transfer Bench
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
## 2020-07-25 NOTE — CARE PLAN
Problem: Safety  Goal: Will remain free from falls  Outcome: PROGRESSING AS EXPECTED  Note: Treaded socks in place, bed in low, locked position.  Side rails up x2.  Call light within reach,  Patient calls appropriately.  Patient is up ad radhika with TLSO on  when out of bed.     Problem: Bowel/Gastric:  Goal: Normal bowel function is maintained or improved  Outcome: PROGRESSING AS EXPECTED  Flowsheets (Taken 7/24/2020 1951 by Roz Bruce R.N.)  Last BM: 07/24/20  Note:  Patient has no issues with bowel movements at this time.
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
## 2020-07-25 NOTE — CARE PLAN
Problem: Communication  Goal: The ability to communicate needs accurately and effectively will improve  Outcome: PROGRESSING AS EXPECTED  Intervention: Educate patient and significant other/support system about the plan of care, procedures, treatments, medications and allow for questions  Note: Discussed POC with pt, all questions and concerns were addressed.      Problem: Infection  Goal: Will remain free from infection  Outcome: PROGRESSING AS EXPECTED  Intervention: Assess signs and symptoms of infection  Note: No s/s of infection noted.

## 2020-07-25 NOTE — DISCHARGE PLANNING
Meds-to-Beds: Discharge prescription orders listed below delivered to patient's bedside and sent down to central pharmacy as home medications. Bag #[id number]. RN notified. Patient counseled.      Patient presented valid photo ID for morphine and oxycodone prescriptions.      Sanjiv Cali   Home Medication Instructions APURVA:[id number]    Printed on:07/24/20 1717   Medication Information                      gabapentin (NEURONTIN) 300 MG Cap  Take 1 Cap by mouth 3 times a day for 21 days.             levETIRAcetam (KEPPRA) 500 MG Tab  Take 1 Tab by mouth 2 Times a Day for 2 days.             lidocaine (LIDODERM) 5 % Patch  Apply 1 Patch to skin as directed every 24 hours.             metaxalone (SKELAXIN) 800 MG Tab  Take 1 Tab by mouth 3 times a day as needed.             morphine ER (MS CONTIN) 15 MG Tab CR tablet  Take 1 Tab by mouth every 12 hours for 7 days.             oxyCODONE immediate-release (ROXICODONE) 5 MG Tab  Take 1 Tab by mouth every 6 hours as needed for up to 7 days.               Yovana Ribeiro, PharmD

## 2020-07-25 NOTE — PROGRESS NOTES
"/84   Pulse (!) 51   Temp 36.6 °C (97.8 °F) (Temporal)   Resp 18   Ht 1.778 m (5' 10\")   Wt 82.2 kg (181 lb 3.5 oz)   SpO2 99%   BMI 26.00 kg/m²    Patient discharge instructions and controlled substance informed use consent covered and signed at bedside.   Patient's medications picked up from pharmacy and given to patient.  Patient taken downstairs to [employer] waiting, via wheelchair with all belongings and FWW. Patient to follow up with [facility] in 6 weeks.  "
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
## 2020-07-30 ENCOUNTER — HOSPITAL ENCOUNTER (EMERGENCY)
Facility: MEDICAL CENTER | Age: 50
End: 2020-07-30
Attending: EMERGENCY MEDICINE
Payer: MEDICARE

## 2020-07-30 VITALS
DIASTOLIC BLOOD PRESSURE: 88 MMHG | SYSTOLIC BLOOD PRESSURE: 130 MMHG | BODY MASS INDEX: 24.75 KG/M2 | TEMPERATURE: 97.9 F | HEIGHT: 71 IN | OXYGEN SATURATION: 97 % | HEART RATE: 112 BPM | WEIGHT: 176.81 LBS | RESPIRATION RATE: 20 BRPM

## 2020-07-30 PROCEDURE — 302449 STATCHG TRIAGE ONLY (STATISTIC)

## 2020-07-30 SDOH — HEALTH STABILITY: MENTAL HEALTH: HOW OFTEN DO YOU HAVE A DRINK CONTAINING ALCOHOL?: MONTHLY OR LESS

## 2020-07-30 SDOH — HEALTH STABILITY: MENTAL HEALTH: HOW MANY STANDARD DRINKS CONTAINING ALCOHOL DO YOU HAVE ON A TYPICAL DAY?: 1 OR 2

## 2020-07-30 SDOH — HEALTH STABILITY: MENTAL HEALTH: HOW OFTEN DO YOU HAVE 6 OR MORE DRINKS ON ONE OCCASION?: NEVER

## 2020-07-30 ASSESSMENT — FIBROSIS 4 INDEX: FIB4 SCORE: 4.35
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
## 2020-08-01 ENCOUNTER — APPOINTMENT (OUTPATIENT)
Dept: RADIOLOGY | Facility: MEDICAL CENTER | Age: 50
End: 2020-08-01
Attending: EMERGENCY MEDICINE
Payer: MEDICARE

## 2020-08-01 ENCOUNTER — HOSPITAL ENCOUNTER (EMERGENCY)
Facility: MEDICAL CENTER | Age: 50
End: 2020-08-01
Attending: EMERGENCY MEDICINE | Admitting: EMERGENCY MEDICINE
Payer: MEDICARE

## 2020-08-01 VITALS
HEART RATE: 67 BPM | BODY MASS INDEX: 25.2 KG/M2 | OXYGEN SATURATION: 91 % | DIASTOLIC BLOOD PRESSURE: 82 MMHG | RESPIRATION RATE: 18 BRPM | SYSTOLIC BLOOD PRESSURE: 134 MMHG | HEIGHT: 71 IN | WEIGHT: 180 LBS | TEMPERATURE: 98 F

## 2020-08-01 DIAGNOSIS — S32.030A COMPRESSION FRACTURE OF L3 VERTEBRA, INITIAL ENCOUNTER (HCC): ICD-10-CM

## 2020-08-01 DIAGNOSIS — M54.50 BILATERAL LOW BACK PAIN WITHOUT SCIATICA, UNSPECIFIED CHRONICITY: ICD-10-CM

## 2020-08-01 DIAGNOSIS — S16.1XXA STRAIN OF NECK MUSCLE, INITIAL ENCOUNTER: ICD-10-CM

## 2020-08-01 DIAGNOSIS — S09.90XA CLOSED HEAD INJURY, INITIAL ENCOUNTER: ICD-10-CM

## 2020-08-01 DIAGNOSIS — M25.552 HIP PAIN, ACUTE, LEFT: ICD-10-CM
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
## 2020-08-01 PROCEDURE — 70450 CT HEAD/BRAIN W/O DYE: CPT

## 2020-08-01 PROCEDURE — 73501 X-RAY EXAM HIP UNI 1 VIEW: CPT | Mod: LT

## 2020-08-01 PROCEDURE — 700102 HCHG RX REV CODE 250 W/ 637 OVERRIDE(OP): Performed by: EMERGENCY MEDICINE

## 2020-08-01 PROCEDURE — 72125 CT NECK SPINE W/O DYE: CPT

## 2020-08-01 PROCEDURE — 99285 EMERGENCY DEPT VISIT HI MDM: CPT

## 2020-08-01 PROCEDURE — 72131 CT LUMBAR SPINE W/O DYE: CPT

## 2020-08-01 PROCEDURE — A9270 NON-COVERED ITEM OR SERVICE: HCPCS | Performed by: EMERGENCY MEDICINE

## 2020-08-01 RX ORDER — CYCLOBENZAPRINE HCL 10 MG
10 TABLET ORAL 3 TIMES DAILY PRN
Qty: 15 TAB | Refills: 0 | Status: SHIPPED | OUTPATIENT
Start: 2020-08-01

## 2020-08-01 RX ORDER — OXYCODONE HYDROCHLORIDE AND ACETAMINOPHEN 5; 325 MG/1; MG/1
1 TABLET ORAL EVERY 4 HOURS PRN
Qty: 12 TAB | Refills: 0 | Status: SHIPPED | OUTPATIENT
Start: 2020-08-01 | End: 2020-08-13

## 2020-08-01 RX ORDER — OXYCODONE HYDROCHLORIDE AND ACETAMINOPHEN 5; 325 MG/1; MG/1
1 TABLET ORAL ONCE
Status: COMPLETED | OUTPATIENT
Start: 2020-08-01 | End: 2020-08-01

## 2020-08-01 RX ADMIN — OXYCODONE HYDROCHLORIDE AND ACETAMINOPHEN 1 TABLET: 5; 325 TABLET ORAL at 12:15

## 2020-08-01 SDOH — HEALTH STABILITY: MENTAL HEALTH: HOW MANY STANDARD DRINKS CONTAINING ALCOHOL DO YOU HAVE ON A TYPICAL DAY?: 3 OR 4

## 2020-08-01 SDOH — HEALTH STABILITY: MENTAL HEALTH: HOW OFTEN DO YOU HAVE 6 OR MORE DRINKS ON ONE OCCASION?: NOT ASKED

## 2020-08-01 ASSESSMENT — LIFESTYLE VARIABLES
DOES PATIENT WANT TO STOP DRINKING: NO
DO YOU DRINK ALCOHOL: YES

## 2020-08-01 ASSESSMENT — FIBROSIS 4 INDEX: FIB4 SCORE: 4.35
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
## 2020-08-01 NOTE — ED NOTES
Patient ambulatory to BR, steady gait.  Patient agrees to POC - reports will DC via [facility] today.
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
## 2020-08-01 NOTE — ED NOTES
PIV removed and bandaged.  Sanjiv Cali discharged via ambulation with TLSO in place, steady gait.  Discharge instructions given and reviewed, patient educated to follow up with neuro as educated at last encounter and DC, verbalized understanding.  Prescriptions given x 2.  All personal belongings in possession.  No questions at this time.

## 2020-08-02 ASSESSMENT — ENCOUNTER SYMPTOMS
VOMITING: 0
BACK PAIN: 1
SHORTNESS OF BREATH: 0
FALLS: 1
HEADACHES: 1
NECK PAIN: 1
ABDOMINAL PAIN: 0
SPEECH CHANGE: 0
FEVER: 0
FOCAL WEAKNESS: 0
TINGLING: 0